# Patient Record
Sex: MALE | Race: WHITE | NOT HISPANIC OR LATINO | Employment: OTHER | URBAN - METROPOLITAN AREA
[De-identification: names, ages, dates, MRNs, and addresses within clinical notes are randomized per-mention and may not be internally consistent; named-entity substitution may affect disease eponyms.]

---

## 2017-02-06 ENCOUNTER — OFFICE VISIT - HEALTHEAST (OUTPATIENT)
Dept: FAMILY MEDICINE | Facility: CLINIC | Age: 57
End: 2017-02-06

## 2017-02-06 DIAGNOSIS — I10 ESSENTIAL HYPERTENSION WITH GOAL BLOOD PRESSURE LESS THAN 140/90: ICD-10-CM

## 2017-02-06 DIAGNOSIS — R73.09 ELEVATED GLUCOSE: ICD-10-CM

## 2017-02-06 LAB — HBA1C MFR BLD: 5.4 % (ref 3.5–6)

## 2017-02-06 ASSESSMENT — MIFFLIN-ST. JEOR: SCORE: 1953.23

## 2017-02-15 ENCOUNTER — COMMUNICATION - HEALTHEAST (OUTPATIENT)
Dept: FAMILY MEDICINE | Facility: CLINIC | Age: 57
End: 2017-02-15

## 2017-02-15 DIAGNOSIS — F01.53 VASCULAR DEMENTIA WITH DEPRESSED MOOD (H): ICD-10-CM

## 2017-03-06 ENCOUNTER — OFFICE VISIT - HEALTHEAST (OUTPATIENT)
Dept: FAMILY MEDICINE | Facility: CLINIC | Age: 57
End: 2017-03-06

## 2017-03-06 DIAGNOSIS — I10 ESSENTIAL HYPERTENSION WITH GOAL BLOOD PRESSURE LESS THAN 140/90: ICD-10-CM

## 2017-03-06 ASSESSMENT — MIFFLIN-ST. JEOR: SCORE: 1962.48

## 2017-03-14 ENCOUNTER — COMMUNICATION - HEALTHEAST (OUTPATIENT)
Dept: LAB | Facility: CLINIC | Age: 57
End: 2017-03-14

## 2017-03-14 DIAGNOSIS — D35.2 PITUITARY ADENOMA (H): ICD-10-CM

## 2017-03-14 DIAGNOSIS — E29.1 HYPOGONADISM IN MALE: ICD-10-CM

## 2017-03-14 DIAGNOSIS — E22.1 HYPERPROLACTINEMIA (H): ICD-10-CM

## 2017-03-17 ENCOUNTER — COMMUNICATION - HEALTHEAST (OUTPATIENT)
Dept: FAMILY MEDICINE | Facility: CLINIC | Age: 57
End: 2017-03-17

## 2017-03-17 DIAGNOSIS — J30.9 ALLERGIC RHINITIS: ICD-10-CM

## 2017-03-17 DIAGNOSIS — J45.909 EXTRINSIC ASTHMA: ICD-10-CM

## 2017-04-04 ENCOUNTER — COMMUNICATION - HEALTHEAST (OUTPATIENT)
Dept: FAMILY MEDICINE | Facility: CLINIC | Age: 57
End: 2017-04-04

## 2017-04-04 DIAGNOSIS — I10 UNSPECIFIED ESSENTIAL HYPERTENSION: ICD-10-CM

## 2017-04-14 ENCOUNTER — AMBULATORY - HEALTHEAST (OUTPATIENT)
Dept: LAB | Facility: CLINIC | Age: 57
End: 2017-04-14

## 2017-04-14 DIAGNOSIS — E22.1 HYPERPROLACTINEMIA (H): ICD-10-CM

## 2017-04-14 DIAGNOSIS — D35.2 PITUITARY ADENOMA (H): ICD-10-CM

## 2017-04-14 DIAGNOSIS — E29.1 HYPOGONADISM IN MALE: ICD-10-CM

## 2017-04-14 LAB
CREAT SERPL-MCNC: 0.74 MG/DL (ref 0.7–1.3)
GFR SERPL CREATININE-BSD FRML MDRD: >60 ML/MIN/1.73M2

## 2017-04-15 LAB
FREE PSA/PSA RATIO: NORMAL RATIO
PSA FREE SERPL-MCNC: 0.2 NG/ML
PSA SERPL-MCNC: 0.43 NG/ML

## 2017-04-21 ENCOUNTER — OFFICE VISIT - HEALTHEAST (OUTPATIENT)
Dept: ENDOCRINOLOGY | Facility: CLINIC | Age: 57
End: 2017-04-21

## 2017-04-21 DIAGNOSIS — E29.1 HYPOGONADISM IN MALE: ICD-10-CM

## 2017-04-21 ASSESSMENT — MIFFLIN-ST. JEOR: SCORE: 1982.06

## 2017-06-29 ENCOUNTER — COMMUNICATION - HEALTHEAST (OUTPATIENT)
Dept: FAMILY MEDICINE | Facility: CLINIC | Age: 57
End: 2017-06-29

## 2017-06-29 DIAGNOSIS — J45.30 MILD PERSISTENT ASTHMA: ICD-10-CM

## 2017-07-02 ENCOUNTER — COMMUNICATION - HEALTHEAST (OUTPATIENT)
Dept: FAMILY MEDICINE | Facility: CLINIC | Age: 57
End: 2017-07-02

## 2017-07-02 DIAGNOSIS — J45.909 EXTRINSIC ASTHMA: ICD-10-CM

## 2017-07-02 DIAGNOSIS — J30.9 ALLERGIC RHINITIS: ICD-10-CM

## 2017-07-02 DIAGNOSIS — I10 UNSPECIFIED ESSENTIAL HYPERTENSION: ICD-10-CM

## 2017-08-28 ENCOUNTER — COMMUNICATION - HEALTHEAST (OUTPATIENT)
Dept: FAMILY MEDICINE | Facility: CLINIC | Age: 57
End: 2017-08-28

## 2017-08-28 DIAGNOSIS — J45.30 MILD PERSISTENT ASTHMA: ICD-10-CM

## 2017-08-31 ENCOUNTER — COMMUNICATION - HEALTHEAST (OUTPATIENT)
Dept: FAMILY MEDICINE | Facility: CLINIC | Age: 57
End: 2017-08-31

## 2017-09-06 ENCOUNTER — OFFICE VISIT - HEALTHEAST (OUTPATIENT)
Dept: FAMILY MEDICINE | Facility: CLINIC | Age: 57
End: 2017-09-06

## 2017-09-06 DIAGNOSIS — I10 ESSENTIAL HYPERTENSION WITH GOAL BLOOD PRESSURE LESS THAN 140/90: ICD-10-CM

## 2017-09-06 DIAGNOSIS — Z23 NEED FOR VACCINATION: ICD-10-CM

## 2017-09-06 RX ORDER — EPINEPHRINE 0.3 MG/.3ML
INJECTION SUBCUTANEOUS
Refills: 0 | Status: SHIPPED | COMMUNITY
Start: 2017-09-05 | End: 2022-09-06

## 2017-09-06 ASSESSMENT — MIFFLIN-ST. JEOR: SCORE: 1986.1

## 2018-01-03 ENCOUNTER — OFFICE VISIT - HEALTHEAST (OUTPATIENT)
Dept: FAMILY MEDICINE | Facility: CLINIC | Age: 58
End: 2018-01-03

## 2018-01-03 DIAGNOSIS — H26.492 CLOUDY POSTERIOR CAPSULE, LEFT: ICD-10-CM

## 2018-01-03 DIAGNOSIS — Z01.818 PREOP EXAMINATION: ICD-10-CM

## 2018-01-03 ASSESSMENT — MIFFLIN-ST. JEOR: SCORE: 1978.16

## 2018-01-24 ENCOUNTER — COMMUNICATION - HEALTHEAST (OUTPATIENT)
Dept: ENDOCRINOLOGY | Facility: CLINIC | Age: 58
End: 2018-01-24

## 2018-01-24 DIAGNOSIS — E22.1 HYPERPROLACTINEMIA (H): ICD-10-CM

## 2018-01-24 DIAGNOSIS — E29.1 TESTICULAR HYPOFUNCTION: ICD-10-CM

## 2018-01-24 DIAGNOSIS — D35.2 PITUITARY ADENOMA (H): ICD-10-CM

## 2018-02-14 ENCOUNTER — COMMUNICATION - HEALTHEAST (OUTPATIENT)
Dept: FAMILY MEDICINE | Facility: CLINIC | Age: 58
End: 2018-02-14

## 2018-02-14 DIAGNOSIS — F01.53 VASCULAR DEMENTIA WITH DEPRESSED MOOD (H): ICD-10-CM

## 2018-03-06 ENCOUNTER — OFFICE VISIT - HEALTHEAST (OUTPATIENT)
Dept: FAMILY MEDICINE | Facility: CLINIC | Age: 58
End: 2018-03-06

## 2018-03-06 DIAGNOSIS — I10 ESSENTIAL HYPERTENSION WITH GOAL BLOOD PRESSURE LESS THAN 140/90: ICD-10-CM

## 2018-03-06 LAB
ANION GAP SERPL CALCULATED.3IONS-SCNC: 8 MMOL/L (ref 5–18)
BUN SERPL-MCNC: 11 MG/DL (ref 8–22)
CALCIUM SERPL-MCNC: 9.6 MG/DL (ref 8.5–10.5)
CHLORIDE BLD-SCNC: 102 MMOL/L (ref 98–107)
CO2 SERPL-SCNC: 33 MMOL/L (ref 22–31)
CREAT SERPL-MCNC: 0.68 MG/DL (ref 0.7–1.3)
GFR SERPL CREATININE-BSD FRML MDRD: >60 ML/MIN/1.73M2
GLUCOSE BLD-MCNC: 86 MG/DL (ref 70–125)
POTASSIUM BLD-SCNC: 4.9 MMOL/L (ref 3.5–5)
SODIUM SERPL-SCNC: 143 MMOL/L (ref 136–145)

## 2018-03-06 ASSESSMENT — MIFFLIN-ST. JEOR: SCORE: 1970.22

## 2018-03-19 ENCOUNTER — COMMUNICATION - HEALTHEAST (OUTPATIENT)
Dept: FAMILY MEDICINE | Facility: CLINIC | Age: 58
End: 2018-03-19

## 2018-03-19 DIAGNOSIS — I10 ESSENTIAL HYPERTENSION WITH GOAL BLOOD PRESSURE LESS THAN 140/90: ICD-10-CM

## 2018-04-03 ENCOUNTER — AMBULATORY - HEALTHEAST (OUTPATIENT)
Dept: ENDOCRINOLOGY | Facility: CLINIC | Age: 58
End: 2018-04-03

## 2018-04-03 DIAGNOSIS — E29.1 HYPOGONADISM IN MALE: ICD-10-CM

## 2018-04-16 ENCOUNTER — AMBULATORY - HEALTHEAST (OUTPATIENT)
Dept: LAB | Facility: CLINIC | Age: 58
End: 2018-04-16

## 2018-04-16 DIAGNOSIS — E29.1 HYPOGONADISM IN MALE: ICD-10-CM

## 2018-04-16 LAB
CALCIUM SERPL-MCNC: 9.1 MG/DL (ref 8.5–10.5)
CREAT SERPL-MCNC: 0.67 MG/DL (ref 0.7–1.3)
GFR SERPL CREATININE-BSD FRML MDRD: >60 ML/MIN/1.73M2
POTASSIUM BLD-SCNC: 4.7 MMOL/L (ref 3.5–5)
PROLACTIN SERPL-MCNC: 7.8 NG/ML (ref 0–15)
T4 FREE SERPL-MCNC: 0.9 NG/DL (ref 0.7–1.8)
TSH SERPL DL<=0.005 MIU/L-ACNC: 0.78 UIU/ML (ref 0.3–5)

## 2018-04-17 LAB
25(OH)D3 SERPL-MCNC: 51.9 NG/ML (ref 30–80)
25(OH)D3 SERPL-MCNC: 51.9 NG/ML (ref 30–80)

## 2018-04-18 LAB — TESTOST SERPL-MCNC: 498 NG/DL (ref 221–716)

## 2018-04-20 ENCOUNTER — OFFICE VISIT - HEALTHEAST (OUTPATIENT)
Dept: ENDOCRINOLOGY | Facility: CLINIC | Age: 58
End: 2018-04-20

## 2018-04-20 DIAGNOSIS — E29.1 TESTICULAR HYPOFUNCTION: ICD-10-CM

## 2018-04-20 DIAGNOSIS — E22.1 HYPERPROLACTINEMIA (H): ICD-10-CM

## 2018-04-20 DIAGNOSIS — D35.2 PITUITARY ADENOMA (H): ICD-10-CM

## 2018-04-20 ASSESSMENT — MIFFLIN-ST. JEOR: SCORE: 1960.01

## 2018-06-05 ENCOUNTER — OFFICE VISIT - HEALTHEAST (OUTPATIENT)
Dept: FAMILY MEDICINE | Facility: CLINIC | Age: 58
End: 2018-06-05

## 2018-06-05 DIAGNOSIS — I10 ESSENTIAL HYPERTENSION WITH GOAL BLOOD PRESSURE LESS THAN 140/90: ICD-10-CM

## 2018-06-05 DIAGNOSIS — J45.30 MILD PERSISTENT ASTHMA WITHOUT COMPLICATION: ICD-10-CM

## 2018-06-05 DIAGNOSIS — J30.1 CHRONIC SEASONAL ALLERGIC RHINITIS DUE TO POLLEN: ICD-10-CM

## 2018-06-05 ASSESSMENT — MIFFLIN-ST. JEOR: SCORE: 1977.02

## 2018-12-05 ENCOUNTER — OFFICE VISIT - HEALTHEAST (OUTPATIENT)
Dept: FAMILY MEDICINE | Facility: CLINIC | Age: 58
End: 2018-12-05

## 2018-12-05 DIAGNOSIS — Z23 NEED FOR VACCINATION: ICD-10-CM

## 2018-12-05 DIAGNOSIS — Z12.11 SCREEN FOR COLON CANCER: ICD-10-CM

## 2018-12-05 DIAGNOSIS — I10 ESSENTIAL HYPERTENSION WITH GOAL BLOOD PRESSURE LESS THAN 140/90: ICD-10-CM

## 2018-12-05 ASSESSMENT — MIFFLIN-ST. JEOR: SCORE: 1953.88

## 2019-01-19 ENCOUNTER — COMMUNICATION - HEALTHEAST (OUTPATIENT)
Dept: FAMILY MEDICINE | Facility: CLINIC | Age: 59
End: 2019-01-19

## 2019-01-19 DIAGNOSIS — I10 ESSENTIAL HYPERTENSION WITH GOAL BLOOD PRESSURE LESS THAN 140/90: ICD-10-CM

## 2019-03-02 ENCOUNTER — COMMUNICATION - HEALTHEAST (OUTPATIENT)
Dept: FAMILY MEDICINE | Facility: CLINIC | Age: 59
End: 2019-03-02

## 2019-03-02 DIAGNOSIS — F01.53 VASCULAR DEMENTIA WITH DEPRESSED MOOD (H): ICD-10-CM

## 2019-04-12 ENCOUNTER — AMBULATORY - HEALTHEAST (OUTPATIENT)
Dept: LAB | Facility: CLINIC | Age: 59
End: 2019-04-12

## 2019-04-12 DIAGNOSIS — D35.2 PITUITARY ADENOMA (H): ICD-10-CM

## 2019-04-12 DIAGNOSIS — E29.1 TESTICULAR HYPOFUNCTION: ICD-10-CM

## 2019-04-12 DIAGNOSIS — E22.1 HYPERPROLACTINEMIA (H): ICD-10-CM

## 2019-04-12 LAB
25(OH)D3 SERPL-MCNC: 46.5 NG/ML (ref 30–80)
25(OH)D3 SERPL-MCNC: 46.5 NG/ML (ref 30–80)
CALCIUM SERPL-MCNC: 9.4 MG/DL (ref 8.5–10.5)
CREAT SERPL-MCNC: 0.76 MG/DL (ref 0.7–1.3)
GFR SERPL CREATININE-BSD FRML MDRD: >60 ML/MIN/1.73M2
POTASSIUM BLD-SCNC: 4.6 MMOL/L (ref 3.5–5)
PROLACTIN SERPL-MCNC: 9.5 NG/ML (ref 0–15)

## 2019-04-15 ENCOUNTER — AMBULATORY - HEALTHEAST (OUTPATIENT)
Dept: ENDOCRINOLOGY | Facility: CLINIC | Age: 59
End: 2019-04-15

## 2019-04-15 DIAGNOSIS — E55.9 VITAMIN D DEFICIENCY: ICD-10-CM

## 2019-04-15 DIAGNOSIS — E22.1 HYPERPROLACTINEMIA (H): ICD-10-CM

## 2019-04-15 DIAGNOSIS — E29.1 HYPOGONADISM IN MALE: ICD-10-CM

## 2019-06-04 ENCOUNTER — OFFICE VISIT - HEALTHEAST (OUTPATIENT)
Dept: FAMILY MEDICINE | Facility: CLINIC | Age: 59
End: 2019-06-04

## 2019-06-04 DIAGNOSIS — J45.30 MILD PERSISTENT ASTHMA, UNCOMPLICATED: ICD-10-CM

## 2019-06-04 DIAGNOSIS — I10 ESSENTIAL HYPERTENSION WITH GOAL BLOOD PRESSURE LESS THAN 140/90: ICD-10-CM

## 2019-06-04 ASSESSMENT — MIFFLIN-ST. JEOR: SCORE: 1979.96

## 2019-06-25 ENCOUNTER — OFFICE VISIT - HEALTHEAST (OUTPATIENT)
Dept: FAMILY MEDICINE | Facility: CLINIC | Age: 59
End: 2019-06-25

## 2019-06-25 DIAGNOSIS — I10 ESSENTIAL HYPERTENSION WITH GOAL BLOOD PRESSURE LESS THAN 140/90: ICD-10-CM

## 2019-06-25 DIAGNOSIS — J45.30 MILD PERSISTENT ASTHMA WITHOUT COMPLICATION: ICD-10-CM

## 2019-06-25 DIAGNOSIS — F33.1 MAJOR DEPRESSIVE DISORDER, RECURRENT EPISODE, MODERATE (H): ICD-10-CM

## 2019-06-25 ASSESSMENT — MIFFLIN-ST. JEOR: SCORE: 1983.14

## 2019-06-26 ENCOUNTER — COMMUNICATION - HEALTHEAST (OUTPATIENT)
Dept: ENDOCRINOLOGY | Facility: CLINIC | Age: 59
End: 2019-06-26

## 2019-06-26 DIAGNOSIS — E29.1 TESTICULAR HYPOFUNCTION: ICD-10-CM

## 2019-06-26 DIAGNOSIS — D35.2 PITUITARY ADENOMA (H): ICD-10-CM

## 2019-06-26 DIAGNOSIS — E22.1 HYPERPROLACTINEMIA (H): ICD-10-CM

## 2019-07-13 ENCOUNTER — COMMUNICATION - HEALTHEAST (OUTPATIENT)
Dept: FAMILY MEDICINE | Facility: CLINIC | Age: 59
End: 2019-07-13

## 2019-07-13 DIAGNOSIS — J30.1 CHRONIC SEASONAL ALLERGIC RHINITIS DUE TO POLLEN: ICD-10-CM

## 2019-07-13 DIAGNOSIS — I10 ESSENTIAL HYPERTENSION WITH GOAL BLOOD PRESSURE LESS THAN 140/90: ICD-10-CM

## 2019-07-13 DIAGNOSIS — J45.30 MILD PERSISTENT ASTHMA WITHOUT COMPLICATION: ICD-10-CM

## 2019-08-10 ENCOUNTER — COMMUNICATION - HEALTHEAST (OUTPATIENT)
Dept: FAMILY MEDICINE | Facility: CLINIC | Age: 59
End: 2019-08-10

## 2019-08-10 DIAGNOSIS — J45.30 MILD PERSISTENT ASTHMA WITHOUT COMPLICATION: ICD-10-CM

## 2019-08-11 RX ORDER — ALBUTEROL SULFATE 90 UG/1
AEROSOL, METERED RESPIRATORY (INHALATION)
Qty: 8.5 G | Refills: 6 | Status: SHIPPED | OUTPATIENT
Start: 2019-08-11 | End: 2022-12-26

## 2019-08-24 ENCOUNTER — COMMUNICATION - HEALTHEAST (OUTPATIENT)
Dept: ENDOCRINOLOGY | Facility: CLINIC | Age: 59
End: 2019-08-24

## 2019-08-24 DIAGNOSIS — D35.2 PITUITARY ADENOMA (H): ICD-10-CM

## 2019-08-24 DIAGNOSIS — E22.1 HYPERPROLACTINEMIA (H): ICD-10-CM

## 2019-08-24 DIAGNOSIS — E29.1 TESTICULAR HYPOFUNCTION: ICD-10-CM

## 2019-10-25 ENCOUNTER — COMMUNICATION - HEALTHEAST (OUTPATIENT)
Dept: FAMILY MEDICINE | Facility: CLINIC | Age: 59
End: 2019-10-25

## 2019-11-16 ENCOUNTER — COMMUNICATION - HEALTHEAST (OUTPATIENT)
Dept: ENDOCRINOLOGY | Facility: CLINIC | Age: 59
End: 2019-11-16

## 2019-11-16 ENCOUNTER — COMMUNICATION - HEALTHEAST (OUTPATIENT)
Dept: FAMILY MEDICINE | Facility: CLINIC | Age: 59
End: 2019-11-16

## 2019-11-16 DIAGNOSIS — D35.2 PITUITARY ADENOMA (H): ICD-10-CM

## 2019-11-16 DIAGNOSIS — E29.1 TESTICULAR HYPOFUNCTION: ICD-10-CM

## 2019-11-16 DIAGNOSIS — E22.1 HYPERPROLACTINEMIA (H): ICD-10-CM

## 2019-11-16 DIAGNOSIS — F01.53 VASCULAR DEMENTIA WITH DEPRESSED MOOD (H): ICD-10-CM

## 2019-12-16 ENCOUNTER — OFFICE VISIT - HEALTHEAST (OUTPATIENT)
Dept: FAMILY MEDICINE | Facility: CLINIC | Age: 59
End: 2019-12-16

## 2019-12-16 DIAGNOSIS — Z23 NEED FOR IMMUNIZATION AGAINST INFLUENZA: ICD-10-CM

## 2019-12-16 DIAGNOSIS — F33.1 MAJOR DEPRESSIVE DISORDER, RECURRENT EPISODE, MODERATE (H): ICD-10-CM

## 2019-12-16 DIAGNOSIS — I10 ESSENTIAL HYPERTENSION WITH GOAL BLOOD PRESSURE LESS THAN 140/90: ICD-10-CM

## 2019-12-16 ASSESSMENT — PATIENT HEALTH QUESTIONNAIRE - PHQ9: SUM OF ALL RESPONSES TO PHQ QUESTIONS 1-9: 5

## 2019-12-16 ASSESSMENT — MIFFLIN-ST. JEOR: SCORE: 2000.61

## 2020-01-18 ENCOUNTER — COMMUNICATION - HEALTHEAST (OUTPATIENT)
Dept: ENDOCRINOLOGY | Facility: CLINIC | Age: 60
End: 2020-01-18

## 2020-01-18 DIAGNOSIS — D35.2 PITUITARY ADENOMA (H): ICD-10-CM

## 2020-01-18 DIAGNOSIS — E22.1 HYPERPROLACTINEMIA (H): ICD-10-CM

## 2020-01-18 DIAGNOSIS — E29.1 TESTICULAR HYPOFUNCTION: ICD-10-CM

## 2020-02-19 ENCOUNTER — OFFICE VISIT - HEALTHEAST (OUTPATIENT)
Dept: FAMILY MEDICINE | Facility: CLINIC | Age: 60
End: 2020-02-19

## 2020-02-19 DIAGNOSIS — I10 ESSENTIAL HYPERTENSION WITH GOAL BLOOD PRESSURE LESS THAN 140/90: ICD-10-CM

## 2020-02-19 DIAGNOSIS — J45.30 MILD PERSISTENT ASTHMA WITHOUT COMPLICATION: ICD-10-CM

## 2020-02-19 DIAGNOSIS — Z12.11 SCREEN FOR COLON CANCER: ICD-10-CM

## 2020-02-19 DIAGNOSIS — F33.1 MAJOR DEPRESSIVE DISORDER, RECURRENT EPISODE, MODERATE (H): ICD-10-CM

## 2020-02-19 ASSESSMENT — MIFFLIN-ST. JEOR: SCORE: 1983.6

## 2020-02-23 ENCOUNTER — COMMUNICATION - HEALTHEAST (OUTPATIENT)
Dept: FAMILY MEDICINE | Facility: CLINIC | Age: 60
End: 2020-02-23

## 2020-02-24 ENCOUNTER — RECORDS - HEALTHEAST (OUTPATIENT)
Dept: ADMINISTRATIVE | Facility: OTHER | Age: 60
End: 2020-02-24

## 2020-03-15 ENCOUNTER — COMMUNICATION - HEALTHEAST (OUTPATIENT)
Dept: ENDOCRINOLOGY | Facility: CLINIC | Age: 60
End: 2020-03-15

## 2020-03-15 DIAGNOSIS — E29.1 TESTICULAR HYPOFUNCTION: ICD-10-CM

## 2020-03-15 DIAGNOSIS — E22.1 HYPERPROLACTINEMIA (H): ICD-10-CM

## 2020-03-15 DIAGNOSIS — D35.2 PITUITARY ADENOMA (H): ICD-10-CM

## 2020-04-10 ENCOUNTER — COMMUNICATION - HEALTHEAST (OUTPATIENT)
Dept: FAMILY MEDICINE | Facility: CLINIC | Age: 60
End: 2020-04-10

## 2020-04-10 ENCOUNTER — COMMUNICATION - HEALTHEAST (OUTPATIENT)
Dept: LAB | Facility: CLINIC | Age: 60
End: 2020-04-10

## 2020-06-14 ENCOUNTER — COMMUNICATION - HEALTHEAST (OUTPATIENT)
Dept: ENDOCRINOLOGY | Facility: CLINIC | Age: 60
End: 2020-06-14

## 2020-06-14 DIAGNOSIS — E29.1 TESTICULAR HYPOFUNCTION: ICD-10-CM

## 2020-06-14 DIAGNOSIS — E22.1 HYPERPROLACTINEMIA (H): ICD-10-CM

## 2020-06-14 DIAGNOSIS — D35.2 PITUITARY ADENOMA (H): ICD-10-CM

## 2020-06-15 ENCOUNTER — COMMUNICATION - HEALTHEAST (OUTPATIENT)
Dept: FAMILY MEDICINE | Facility: CLINIC | Age: 60
End: 2020-06-15

## 2020-06-15 DIAGNOSIS — E22.1 HYPERPROLACTINEMIA (H): ICD-10-CM

## 2020-06-15 DIAGNOSIS — E29.1 TESTICULAR HYPOFUNCTION: ICD-10-CM

## 2020-06-15 DIAGNOSIS — D35.2 PITUITARY ADENOMA (H): ICD-10-CM

## 2020-06-27 ENCOUNTER — COMMUNICATION - HEALTHEAST (OUTPATIENT)
Dept: FAMILY MEDICINE | Facility: CLINIC | Age: 60
End: 2020-06-27

## 2020-06-27 DIAGNOSIS — J45.30 MILD PERSISTENT ASTHMA WITHOUT COMPLICATION: ICD-10-CM

## 2020-06-27 DIAGNOSIS — J30.1 CHRONIC SEASONAL ALLERGIC RHINITIS DUE TO POLLEN: ICD-10-CM

## 2020-06-27 DIAGNOSIS — I10 ESSENTIAL HYPERTENSION WITH GOAL BLOOD PRESSURE LESS THAN 140/90: ICD-10-CM

## 2020-08-26 ENCOUNTER — OFFICE VISIT - HEALTHEAST (OUTPATIENT)
Dept: FAMILY MEDICINE | Facility: CLINIC | Age: 60
End: 2020-08-26

## 2020-08-26 DIAGNOSIS — E29.1 TESTICULAR HYPOFUNCTION: ICD-10-CM

## 2020-08-26 DIAGNOSIS — I10 ESSENTIAL HYPERTENSION WITH GOAL BLOOD PRESSURE LESS THAN 140/90: ICD-10-CM

## 2020-08-26 DIAGNOSIS — D35.2 PITUITARY ADENOMA (H): ICD-10-CM

## 2020-08-26 DIAGNOSIS — E22.1 HYPERPROLACTINEMIA (H): ICD-10-CM

## 2020-08-26 LAB
ANION GAP SERPL CALCULATED.3IONS-SCNC: 8 MMOL/L (ref 5–18)
BUN SERPL-MCNC: 12 MG/DL (ref 8–22)
CALCIUM SERPL-MCNC: 8.9 MG/DL (ref 8.5–10.5)
CHLORIDE BLD-SCNC: 100 MMOL/L (ref 98–107)
CO2 SERPL-SCNC: 30 MMOL/L (ref 22–31)
CREAT SERPL-MCNC: 0.7 MG/DL (ref 0.7–1.3)
GFR SERPL CREATININE-BSD FRML MDRD: >60 ML/MIN/1.73M2
GLUCOSE BLD-MCNC: 120 MG/DL (ref 70–125)
POTASSIUM BLD-SCNC: 4.7 MMOL/L (ref 3.5–5)
PROLACTIN SERPL-MCNC: 10.8 NG/ML (ref 0–15)
SODIUM SERPL-SCNC: 138 MMOL/L (ref 136–145)

## 2020-08-26 RX ORDER — CABERGOLINE 0.5 MG/1
TABLET ORAL
Qty: 8 TABLET | Refills: 11 | Status: SHIPPED | OUTPATIENT
Start: 2020-08-26 | End: 2021-11-01

## 2020-08-26 ASSESSMENT — MIFFLIN-ST. JEOR: SCORE: 1973.84

## 2020-08-26 ASSESSMENT — PATIENT HEALTH QUESTIONNAIRE - PHQ9: SUM OF ALL RESPONSES TO PHQ QUESTIONS 1-9: 3

## 2020-10-08 ENCOUNTER — COMMUNICATION - HEALTHEAST (OUTPATIENT)
Dept: FAMILY MEDICINE | Facility: CLINIC | Age: 60
End: 2020-10-08

## 2020-11-07 ENCOUNTER — COMMUNICATION - HEALTHEAST (OUTPATIENT)
Dept: FAMILY MEDICINE | Facility: CLINIC | Age: 60
End: 2020-11-07

## 2020-11-07 DIAGNOSIS — F01.53 VASCULAR DEMENTIA WITH DEPRESSED MOOD (H): ICD-10-CM

## 2020-12-08 ENCOUNTER — COMMUNICATION - HEALTHEAST (OUTPATIENT)
Dept: FAMILY MEDICINE | Facility: CLINIC | Age: 60
End: 2020-12-08

## 2020-12-08 DIAGNOSIS — I10 ESSENTIAL HYPERTENSION WITH GOAL BLOOD PRESSURE LESS THAN 140/90: ICD-10-CM

## 2020-12-09 RX ORDER — LISINOPRIL 40 MG/1
TABLET ORAL
Qty: 90 TABLET | Refills: 2 | Status: SHIPPED | OUTPATIENT
Start: 2020-12-09 | End: 2021-08-28

## 2021-02-26 ENCOUNTER — OFFICE VISIT - HEALTHEAST (OUTPATIENT)
Dept: FAMILY MEDICINE | Facility: CLINIC | Age: 61
End: 2021-02-26

## 2021-02-26 DIAGNOSIS — I10 ESSENTIAL HYPERTENSION WITH GOAL BLOOD PRESSURE LESS THAN 140/90: ICD-10-CM

## 2021-02-26 ASSESSMENT — PATIENT HEALTH QUESTIONNAIRE - PHQ9: SUM OF ALL RESPONSES TO PHQ QUESTIONS 1-9: 5

## 2021-02-26 ASSESSMENT — MIFFLIN-ST. JEOR: SCORE: 1983.03

## 2021-03-29 ENCOUNTER — AMBULATORY - HEALTHEAST (OUTPATIENT)
Dept: NURSING | Facility: CLINIC | Age: 61
End: 2021-03-29

## 2021-04-19 ENCOUNTER — AMBULATORY - HEALTHEAST (OUTPATIENT)
Dept: NURSING | Facility: CLINIC | Age: 61
End: 2021-04-19

## 2021-04-30 ENCOUNTER — OFFICE VISIT - HEALTHEAST (OUTPATIENT)
Dept: FAMILY MEDICINE | Facility: CLINIC | Age: 61
End: 2021-04-30

## 2021-04-30 DIAGNOSIS — I10 ESSENTIAL HYPERTENSION WITH GOAL BLOOD PRESSURE LESS THAN 140/90: ICD-10-CM

## 2021-04-30 ASSESSMENT — MIFFLIN-ST. JEOR: SCORE: 1992.46

## 2021-05-04 ENCOUNTER — RECORDS - HEALTHEAST (OUTPATIENT)
Dept: FAMILY MEDICINE | Facility: CLINIC | Age: 61
End: 2021-05-04

## 2021-05-04 DIAGNOSIS — F01.53 VASCULAR DEMENTIA WITH DEPRESSED MOOD (H): ICD-10-CM

## 2021-05-04 RX ORDER — VENLAFAXINE HYDROCHLORIDE 150 MG/1
CAPSULE, EXTENDED RELEASE ORAL
Qty: 90 CAPSULE | Refills: 3 | Status: SHIPPED | OUTPATIENT
Start: 2021-05-04 | End: 2022-04-12

## 2021-05-26 ASSESSMENT — PATIENT HEALTH QUESTIONNAIRE - PHQ9
SUM OF ALL RESPONSES TO PHQ QUESTIONS 1-9: 3
SUM OF ALL RESPONSES TO PHQ QUESTIONS 1-9: 5

## 2021-05-27 ASSESSMENT — PATIENT HEALTH QUESTIONNAIRE - PHQ9: SUM OF ALL RESPONSES TO PHQ QUESTIONS 1-9: 5

## 2021-05-28 ASSESSMENT — ASTHMA QUESTIONNAIRES
ACT_TOTALSCORE: 24
ACT_TOTALSCORE: 22
ACT_TOTALSCORE: 21

## 2021-05-29 NOTE — PROGRESS NOTES
Assessment: /    Plan:    1. Essential hypertension with goal blood pressure less than 140/90  lisinopril (PRINIVIL,ZESTRIL) 30 MG tablet   2. Mild persistent asthma, uncomplicated  fluticasone propionate (FLOVENT HFA) 110 mcg/actuation inhaler       Increase lisinopril to 30 mg.  He will use Flovent again.  He will obtain a blood pressure cuff.  He will submit a Cologuard sample.  Recheck in 3 weeks.      Subjective:    HPI:  Breezy Bill is a 59-year-old male presenting for follow-up on hypertension and asthma.    He uses albuterol twice per week during the daytime and 3 times per week at night.      Review of Systems: No fever or sputum production      Current Outpatient Medications   Medication Sig Dispense Refill     albuterol (PROAIR HFA) 90 mcg/actuation inhaler Inhale 1-2 puffs every 4 (four) hours as needed. 8.5 Inhaler 11     cabergoline (DOSTINEX) 0.5 mg tablet TAKE 1/2 TABLET BY MOUTH TWICE WEEKLY 12 tablet 1     cholecalciferol, vitamin D3, (VITAMIN D3) 2,000 unit cap Take 1 capsule by mouth daily.       dorzolamide-timolol (COSOPT) 22.3-6.8 mg/mL ophthalmic solution   10     EPINEPHrine (EPIPEN) 0.3 mg/0.3 mL atIn   0     flunisolide (NASALIDE) 25 mcg (0.025 %) Spry USE 1 SPRAY IN EACH NOSTRIL TWICE DAILY. 25 mL 5     hydroCHLOROthiazide (HYDRODIURIL) 12.5 MG tablet Take 1 tablet (12.5 mg total) by mouth daily. 90 tablet 3     ketotifen (ZADITOR) 0.025 % ophthalmic solution 1 drop as needed.       latanoprost (XALATAN) 0.005 % ophthalmic solution   10     loratadine (CLARITIN) 10 mg tablet Take 10 mg by mouth daily.       montelukast (SINGULAIR) 10 mg tablet Take 1 tablet (10 mg total) by mouth daily. 90 tablet 3     MULTIVITAMIN ORAL Take 1 tablet by mouth daily.       venlafaxine (EFFEXOR-XR) 150 MG 24 hr capsule TAKE ONE CAPSULE BY MOUTH ONE TIME DAILY  90 capsule 2     fluticasone propionate (FLOVENT HFA) 110 mcg/actuation inhaler INHALE ONE PUFF BY MOUTH TWICE DAILY 1 Inhaler 5     lisinopril  "(PRINIVIL,ZESTRIL) 30 MG tablet Take 1 tablet (30 mg total) by mouth daily. 90 tablet 3     No current facility-administered medications for this visit.          Objective:    Vitals:    06/04/19 0811 06/04/19 0828   BP: 150/84 148/78   Pulse: 78    Resp: 16    SpO2: 94%    Weight: (!) 260 lb (117.9 kg)    Height: 5' 8.7\" (1.745 m)        Gen:  NAD, VSS  Weight increased 6 pounds  Lungs:  normal  Heart:  normal          ADDITIONAL HISTORY SUMMARIZED (2): None.  DECISION TO OBTAIN EXTRA INFORMATION (1): None.   RADIOLOGY TESTS (1): None.  LABS (1): None.  MEDICINE TESTS (1): None.  INDEPENDENT REVIEW (2 each): None.     Total Data Points: 0    "

## 2021-05-30 VITALS — BODY MASS INDEX: 38.41 KG/M2 | HEIGHT: 69 IN | WEIGHT: 259.36 LBS

## 2021-05-30 VITALS — BODY MASS INDEX: 37.77 KG/M2 | HEIGHT: 69 IN | WEIGHT: 255.04 LBS

## 2021-05-30 VITALS — HEIGHT: 69 IN | BODY MASS INDEX: 37.47 KG/M2 | WEIGHT: 253 LBS

## 2021-05-30 NOTE — TELEPHONE ENCOUNTER
RN cannot approve Refill Request x2    RN can NOT refill these medications  Montelukast (SINGULAIR) med is not covered by policy/route to provider.     hydroCHLOROthiazide (HUDRODIURIL) protocol failed no refill given    Olya Rendon, [facility] Connection Triage/Med Refill 7/13/2019    Requested Prescriptions   Pending Prescriptions Disp Refills     montelukast (SINGULAIR) 10 mg tablet [Pharmacy Med Name: Montelukast Sodium Oral Tablet 10 MG] 90 tablet 2     Sig: TAKE ONE TABLET BY MOUTH ONE TIME DAILY       There is no refill protocol information for this order        hydroCHLOROthiazide (HYDRODIURIL) 12.5 MG tablet [Pharmacy Med Name: hydroCHLOROthiazide Oral Tablet 12.5 MG] 90 tablet 2     Sig: TAKE ONE TABLET BY MOUTH ONE TIME DAILY       Diuretics/Combination Diuretics Refill Protocol  Failed - 7/13/2019 10:53 AM        Failed - Serum Sodium in past 12 months      No results found for: LN-SODIUM          Passed - Visit with PCP or prescribing provider visit in past 12 months     Last office visit with prescriber/PCP: 6/25/2019 Rajiv Figueroa MD OR same dept: 6/25/2019 Rajiv Figueroa MD OR same specialty: 6/25/2019 Rajiv Figueroa MD  Last physical: 1/3/2018 Last MTM visit: Visit date not found   Next visit within 3 mo: Visit date not found  Next physical within 3 mo: Visit date not found  Prescriber OR PCP: Rajiv Figueroa MD  Last diagnosis associated with med order: 1. Mild persistent asthma without complication  - montelukast (SINGULAIR) 10 mg tablet [Pharmacy Med Name: Montelukast Sodium Oral Tablet 10 MG]; TAKE ONE TABLET BY MOUTH ONE TIME DAILY   Dispense: 90 tablet; Refill: 2    2. Chronic seasonal allergic rhinitis due to pollen  - montelukast (SINGULAIR) 10 mg tablet [Pharmacy Med Name: Montelukast Sodium Oral Tablet 10 MG]; TAKE ONE TABLET BY MOUTH ONE TIME DAILY   Dispense: 90 tablet; Refill: 2    3. Essential hypertension with goal blood pressure less than 140/90  -  hydroCHLOROthiazide (HYDRODIURIL) 12.5 MG tablet [Pharmacy Med Name: hydroCHLOROthiazide Oral Tablet 12.5 MG]; TAKE ONE TABLET BY MOUTH ONE TIME DAILY   Dispense: 90 tablet; Refill: 2    If protocol passes may refill for 12 months if within 3 months of last provider visit (or a total of 15 months).             Passed - Serum Potassium in past 12 months      Lab Results   Component Value Date    Potassium 4.6 04/12/2019             Passed - Blood pressure on file in past 12 months     BP Readings from Last 1 Encounters:   06/25/19 138/78             Passed - Serum Creatinine in past 12 months      Creatinine   Date Value Ref Range Status   04/12/2019 0.76 0.70 - 1.30 mg/dL Final

## 2021-05-30 NOTE — TELEPHONE ENCOUNTER
rx refill request received from [facility] for Cabergoline.     Will send to PCP to authorize because Dr. Duncan is on medical leave.     Can you refill this?

## 2021-05-30 NOTE — PROGRESS NOTES
Assessment: /    Plan:    1. Essential hypertension with goal blood pressure less than 140/90     2. Mild persistent asthma without complication     3. Moderate Recurrent Major Depression         Continue current medications.  Recheck in 6 months.      Subjective:    HPI:  Breezy Bill is a 59-year-old male presented for follow-up on hypertension.  We increased lisinopril to 30 mg.    He has been using the Flovent inhaler, and has not needed to use his rescue inhaler.    He has not been feeling depressed, PHQ-9 this month was 3.       Review of Systems: No fever or cough.      Current Outpatient Medications   Medication Sig Dispense Refill     albuterol (PROAIR HFA) 90 mcg/actuation inhaler Inhale 1-2 puffs every 4 (four) hours as needed. 8.5 Inhaler 11     cholecalciferol, vitamin D3, (VITAMIN D3) 2,000 unit cap Take 1 capsule by mouth daily.       dorzolamide-timolol (COSOPT) 22.3-6.8 mg/mL ophthalmic solution   10     EPINEPHrine (EPIPEN) 0.3 mg/0.3 mL atIn   0     flunisolide (NASALIDE) 25 mcg (0.025 %) Spry USE 1 SPRAY IN EACH NOSTRIL TWICE DAILY. 25 mL 5     fluticasone propionate (FLOVENT HFA) 110 mcg/actuation inhaler INHALE ONE PUFF BY MOUTH TWICE DAILY 1 Inhaler 5     hydroCHLOROthiazide (HYDRODIURIL) 12.5 MG tablet Take 1 tablet (12.5 mg total) by mouth daily. 90 tablet 3     ketotifen (ZADITOR) 0.025 % ophthalmic solution 1 drop as needed.       latanoprost (XALATAN) 0.005 % ophthalmic solution   10     lisinopril (PRINIVIL,ZESTRIL) 30 MG tablet Take 1 tablet (30 mg total) by mouth daily. 90 tablet 3     loratadine (CLARITIN) 10 mg tablet Take 10 mg by mouth daily.       montelukast (SINGULAIR) 10 mg tablet Take 1 tablet (10 mg total) by mouth daily. 90 tablet 3     MULTIVITAMIN ORAL Take 1 tablet by mouth daily.       venlafaxine (EFFEXOR-XR) 150 MG 24 hr capsule TAKE ONE CAPSULE BY MOUTH ONE TIME DAILY  90 capsule 2     cabergoline (DOSTINEX) 0.5 mg tablet TAKE 1/2 TABLET BY MOUTH TWICE WEEKLY 12 tablet  0     No current facility-administered medications for this visit.          Objective:    Vitals:    06/25/19 0802   BP: 138/78   Pulse: 72   Resp: 19   Temp: 98.5  F (36.9  C)   SpO2: 92%       Gen:  NAD, VSS  Lungs:  normal  Heart:  normal          ADDITIONAL HISTORY SUMMARIZED (2): None.  DECISION TO OBTAIN EXTRA INFORMATION (1): None.   RADIOLOGY TESTS (1): None.  LABS (1): None.  MEDICINE TESTS (1): None.  INDEPENDENT REVIEW (2 each): None.     Total Data Points: 0

## 2021-05-31 VITALS — WEIGHT: 258.5 LBS | HEIGHT: 69 IN | BODY MASS INDEX: 38.29 KG/M2

## 2021-05-31 VITALS — HEIGHT: 69 IN | WEIGHT: 260.25 LBS | BODY MASS INDEX: 38.55 KG/M2

## 2021-05-31 NOTE — TELEPHONE ENCOUNTER
Refill Approved    Rx renewed per Medication Renewal Policy. Medication was last renewed on 6/5/18.    Tori Ellington, [facility] Triage/Med Refill 8/11/2019     Requested Prescriptions   Pending Prescriptions Disp Refills     PROAIR HFA 90 mcg/actuation inhaler [Pharmacy Med Name: ProAir HFA Inhalation Aerosol Solution 108 (90 Base) MCG/ACT] 8.5 g 10     Sig: INHALE ONE OR TWO PUFFS BY MOUTH EVERY FOUR HOURS AS NEEDED       Albuterol/Levalbuterol Refill Protocol Passed - 8/10/2019  3:17 PM        Passed - PCP or prescribing provider visit in last year     Last office visit with prescriber/PCP: 6/25/2019 Rajiv Figueroa MD OR same dept: 6/25/2019 Rajiv Figueroa MD OR same specialty: 6/25/2019 Rajiv Figueroa MD Last physical: 1/3/2018       Next appt within 3 mo: Visit date not found  Next physical within 3 mo: Visit date not found  Prescriber OR PCP: Rajiv Figueroa MD  Last diagnosis associated with med order: 1. Mild persistent asthma without complication  - PROAIR HFA 90 mcg/actuation inhaler [Pharmacy Med Name: ProAir HFA Inhalation Aerosol Solution 108 (90 Base) MCG/ACT]; INHALE ONE OR TWO PUFFS BY MOUTH EVERY FOUR HOURS AS NEEDED   Dispense: 8.5 g; Refill: 10    If protocol passes may refill for 6 months if within 3 months of last provider visit (or a total of 9 months). If patient requesting >1 inhaler per month refill x 6 months and have patient make appointment with provider.

## 2021-06-01 ENCOUNTER — RECORDS - HEALTHEAST (OUTPATIENT)
Dept: ADMINISTRATIVE | Facility: OTHER | Age: 61
End: 2021-06-01

## 2021-06-01 VITALS — WEIGHT: 254.5 LBS | HEIGHT: 69 IN | BODY MASS INDEX: 37.69 KG/M2

## 2021-06-01 VITALS — BODY MASS INDEX: 38.25 KG/M2 | HEIGHT: 69 IN | WEIGHT: 258.25 LBS

## 2021-06-01 VITALS — HEIGHT: 69 IN | WEIGHT: 256.75 LBS | BODY MASS INDEX: 38.03 KG/M2

## 2021-06-01 DIAGNOSIS — J45.30 MILD PERSISTENT ASTHMA WITHOUT COMPLICATION: ICD-10-CM

## 2021-06-01 DIAGNOSIS — I10 ESSENTIAL HYPERTENSION WITH GOAL BLOOD PRESSURE LESS THAN 140/90: ICD-10-CM

## 2021-06-01 DIAGNOSIS — J30.1 CHRONIC SEASONAL ALLERGIC RHINITIS DUE TO POLLEN: ICD-10-CM

## 2021-06-01 RX ORDER — HYDROCHLOROTHIAZIDE 12.5 MG/1
TABLET ORAL
Qty: 90 TABLET | Refills: 3 | Status: SHIPPED | OUTPATIENT
Start: 2021-06-01 | End: 2022-07-20

## 2021-06-01 RX ORDER — MONTELUKAST SODIUM 10 MG/1
TABLET ORAL
Qty: 90 TABLET | Refills: 3 | Status: SHIPPED | OUTPATIENT
Start: 2021-06-01 | End: 2022-08-01

## 2021-06-02 VITALS — WEIGHT: 254.25 LBS | HEIGHT: 69 IN | BODY MASS INDEX: 37.66 KG/M2

## 2021-06-02 NOTE — TELEPHONE ENCOUNTER
CMT left message x 1. Please review message thread below and advise the patient as indicated. Please schedule if necessary or indicated in message thread. Per clinic outreach policy, writer will call three times before sending letter and postponing 6 weeks. CMT will then repeat call sequence (3 calls, 1 call per week) and send final letter if unable to reach the patient.

## 2021-06-02 NOTE — TELEPHONE ENCOUNTER
reports indicate that the patient needs colon cancer screening. [facility] ordered 12/2018 but no results received. If non-English speaking, CMT will schedule patient to discuss colon cancer screening options with PCP. Writer intends to contact the patient to assist in scheduling and appointing for this purpose. Per clinic policy, writer will three times prior to closing encounter.

## 2021-06-03 VITALS — HEIGHT: 69 IN | BODY MASS INDEX: 38.69 KG/M2 | WEIGHT: 261.25 LBS

## 2021-06-03 VITALS — WEIGHT: 260 LBS | HEIGHT: 69 IN | BODY MASS INDEX: 38.51 KG/M2

## 2021-06-03 NOTE — TELEPHONE ENCOUNTER
CMT left message x 3. Please review message thread below and advise the patient as indicated. Please schedule if necessary or indicated in message thread. Per clinic outreach policy, writer will call three times before sending letter and postponing 6 weeks. CMT will then repeat call sequence (3 calls, 1 call per week) and send final letter if unable to reach the patient. Letter sent.

## 2021-06-03 NOTE — TELEPHONE ENCOUNTER
RN cannot approve Refill Request    RN can NOT refill this medication Protocol failed and NO refill given.       Rianna Hernandez, [facility] Triage/Med Refill 11/17/2019    Requested Prescriptions   Pending Prescriptions Disp Refills     venlafaxine (EFFEXOR-XR) 150 MG 24 hr capsule [Pharmacy Med Name: Venlafaxine HCl ER Oral Capsule Extended Release 24 Hour 150 MG] 90 capsule 3     Sig: TAKE ONE CAPSULE BY MOUTH ONE TIME DAILY       Venlafaxine/Desvenlafaxine Refill Protocol Failed - 11/16/2019  3:14 PM        Failed - LFT or AST or ALT in last year     Albumin   Date Value Ref Range Status   10/13/2016 4.3 3.5 - 5.0 g/dL Final     Bilirubin, Total   Date Value Ref Range Status   10/13/2016 0.5 0.0 - 1.0 mg/dL Final     Bilirubin, Direct   Date Value Ref Range Status   10/13/2016 0.2 <=0.5 mg/dL Final     Alkaline Phosphatase   Date Value Ref Range Status   10/13/2016 89 45 - 120 U/L Final     AST   Date Value Ref Range Status   10/13/2016 23 0 - 40 U/L Final     ALT   Date Value Ref Range Status   10/13/2016 28 0 - 45 U/L Final     Protein, Total   Date Value Ref Range Status   10/13/2016 7.7 6.0 - 8.0 g/dL Final                Failed - Fasting lipid cascade in last year     Cholesterol   Date Value Ref Range Status   04/22/2016 195 <=199 mg/dL Final     Triglycerides   Date Value Ref Range Status   04/22/2016 67 <=149 mg/dL Final     HDL Cholesterol   Date Value Ref Range Status   04/22/2016 55 >=40 mg/dL Final     LDL Calculated   Date Value Ref Range Status   04/22/2016 127 <=129 mg/dL Final     Patient Fasting > 8hrs?   Date Value Ref Range Status   04/22/2016 Yes  Final             Passed - PCP or prescribing provider visit in last year     Last office visit with prescriber/PCP: 6/25/2019 Rajiv Figueroa MD OR same dept: 6/25/2019 Rajiv Figueroa MD OR same specialty: 6/25/2019 Rajiv Figueroa MD  Last physical: 1/3/2018 Last MTM visit: Visit date not found   Next visit within 3 mo: Visit  date not found  Next physical within 3 mo: Visit date not found  Prescriber OR PCP: Rajiv Figueroa MD  Last diagnosis associated with med order: 1. Vascular dementia with depressed mood (H)  - venlafaxine (EFFEXOR-XR) 150 MG 24 hr capsule [Pharmacy Med Name: Venlafaxine HCl ER Oral Capsule Extended Release 24 Hour 150 MG]; TAKE ONE CAPSULE BY MOUTH ONE TIME DAILY   Dispense: 90 capsule; Refill: 1    If protocol passes may refill for 12 months if within 3 months of last provider visit (or a total of 15 months).             Passed - Blood Pressure in last year     BP Readings from Last 1 Encounters:   06/25/19 138/78

## 2021-06-04 VITALS
HEART RATE: 75 BPM | DIASTOLIC BLOOD PRESSURE: 70 MMHG | RESPIRATION RATE: 20 BRPM | BODY MASS INDEX: 38.73 KG/M2 | WEIGHT: 261.5 LBS | HEIGHT: 69 IN | OXYGEN SATURATION: 96 % | SYSTOLIC BLOOD PRESSURE: 136 MMHG | TEMPERATURE: 98.7 F

## 2021-06-04 VITALS
WEIGHT: 257.25 LBS | TEMPERATURE: 98.9 F | BODY MASS INDEX: 38.1 KG/M2 | HEART RATE: 67 BPM | HEIGHT: 69 IN | OXYGEN SATURATION: 95 % | SYSTOLIC BLOOD PRESSURE: 128 MMHG | RESPIRATION RATE: 24 BRPM | DIASTOLIC BLOOD PRESSURE: 66 MMHG

## 2021-06-04 VITALS
WEIGHT: 265.25 LBS | SYSTOLIC BLOOD PRESSURE: 154 MMHG | HEIGHT: 69 IN | HEART RATE: 64 BPM | RESPIRATION RATE: 16 BRPM | DIASTOLIC BLOOD PRESSURE: 84 MMHG | TEMPERATURE: 98.3 F | BODY MASS INDEX: 39.29 KG/M2 | OXYGEN SATURATION: 94 %

## 2021-06-04 NOTE — TELEPHONE ENCOUNTER
CMT left message x 3. Please review message thread below and advise the patient as indicated. Please schedule if necessary or indicated in message thread. Per clinic outreach policy, writer will call three times before sending letter and postponing 6 weeks. CMT will then repeat call sequence (3 calls, 1 call per week) and send final letter if unable to reach the patient.

## 2021-06-04 NOTE — PROGRESS NOTES
Assessment: /    Plan:    1. Essential hypertension with goal blood pressure less than 140/90  lisinopril (PRINIVIL,ZESTRIL) 40 MG tablet   2. Moderate Recurrent Major Depression  PHQ9 Depression Screen   3. Need for immunization against influenza  Influenza, Recombinant, Inj, Quadrivalent, PF, 18+YRS       Increase lisinopril to 40 mg.  He will alternate days with 30 mg until finished.    Recheck in 2 months.      Subjective:    HPI:  Breezy Bill is a 59-year-old male presenting for follow-up on hypertension.  He took his medications at 7:30 am.  BP at home was 136/78.    He takes venlafaxine for depression.  Stable.      Review of Systems:  No fever or cough.      Current Outpatient Medications   Medication Sig Dispense Refill     cabergoline (DOSTINEX) 0.5 mg tablet TAKE 1/2 TABLET BY MOUTH TWICE WEEKLY 8 tablet 0     cholecalciferol, vitamin D3, (VITAMIN D3) 2,000 unit cap Take 1 capsule by mouth daily.       dorzolamide-timolol (COSOPT) 22.3-6.8 mg/mL ophthalmic solution   10     EPINEPHrine (EPIPEN) 0.3 mg/0.3 mL atIn   0     fluticasone propionate (FLOVENT HFA) 110 mcg/actuation inhaler INHALE ONE PUFF BY MOUTH TWICE DAILY 1 Inhaler 5     hydroCHLOROthiazide (HYDRODIURIL) 12.5 MG tablet TAKE ONE TABLET BY MOUTH ONE TIME DAILY  90 tablet 3     ketotifen (ZADITOR) 0.025 % ophthalmic solution 1 drop as needed.       latanoprost (XALATAN) 0.005 % ophthalmic solution   10     loratadine (CLARITIN) 10 mg tablet Take 10 mg by mouth daily.       montelukast (SINGULAIR) 10 mg tablet TAKE ONE TABLET BY MOUTH ONE TIME DAILY  90 tablet 3     MULTIVITAMIN ORAL Take 1 tablet by mouth daily.       PROAIR HFA 90 mcg/actuation inhaler INHALE ONE OR TWO PUFFS BY MOUTH EVERY FOUR HOURS AS NEEDED  8.5 g 6     venlafaxine (EFFEXOR-XR) 150 MG 24 hr capsule TAKE ONE CAPSULE BY MOUTH ONE TIME DAILY  90 capsule 3     lisinopril (PRINIVIL,ZESTRIL) 40 MG tablet Take 1 tablet (40 mg total) by mouth daily. 90 tablet 3     No current  "facility-administered medications for this visit.          Objective:    Vitals:    12/16/19 0805 12/16/19 0825   BP: (!) 160/96 154/84   Pulse: 64    Resp: 16    Temp: 98.3  F (36.8  C)    TempSrc: Oral    SpO2: 94%    Weight: (!) 265 lb 4 oz (120.3 kg)    Height: 5' 8.5\" (1.74 m)        Gen:  NAD, VSS  Lungs:  normal  Heart:  normal    PHQ-9 = 5      ADDITIONAL HISTORY SUMMARIZED (2): None.  DECISION TO OBTAIN EXTRA INFORMATION (1): None.   RADIOLOGY TESTS (1): None.  LABS (1): None.  MEDICINE TESTS (1): None.  INDEPENDENT REVIEW (2 each): None.     Total Data Points: 0    "

## 2021-06-05 VITALS
DIASTOLIC BLOOD PRESSURE: 84 MMHG | TEMPERATURE: 98.7 F | HEIGHT: 69 IN | SYSTOLIC BLOOD PRESSURE: 148 MMHG | OXYGEN SATURATION: 95 % | BODY MASS INDEX: 38.58 KG/M2 | WEIGHT: 260.5 LBS | HEART RATE: 84 BPM

## 2021-06-05 VITALS
OXYGEN SATURATION: 92 % | WEIGHT: 262.75 LBS | HEIGHT: 69 IN | HEART RATE: 79 BPM | SYSTOLIC BLOOD PRESSURE: 136 MMHG | DIASTOLIC BLOOD PRESSURE: 80 MMHG | TEMPERATURE: 98.5 F | BODY MASS INDEX: 38.92 KG/M2

## 2021-06-06 NOTE — PROGRESS NOTES
Assessment: /    Plan:    1. Essential hypertension with goal blood pressure less than 140/90     2. Mild persistent asthma without complication     3. Moderate Recurrent Major Depression     4. Screen for colon cancer  Cologuard       He will continue current medications.    He has endocrinology labs scheduled for April.    He plans to submit a Cologuard sample in the near future.  He still has the kit available.  New order written, so that it will be current.    Recheck in 6 months.      Subjective:    HPI:  Breezy Bill is a 59-year-old male presenting for follow-up on hypertension.  He takes lisinopril and hydrochlorothiazide.    He also has asthma, and has not been having symptoms related to that.  ACT = 22    Depression has been under control, taking venlafaxine.      Review of Systems: No fever, cough or chest pain.      Current Outpatient Medications   Medication Sig Dispense Refill     cabergoline (DOSTINEX) 0.5 mg tablet TAKE 1/2 TABLET BY MOUTH TWICE WEEKLY 8 tablet 0     cholecalciferol, vitamin D3, (VITAMIN D3) 2,000 unit cap Take 1 capsule by mouth daily.       dorzolamide-timolol (COSOPT) 22.3-6.8 mg/mL ophthalmic solution   10     EPINEPHrine (EPIPEN) 0.3 mg/0.3 mL atIn   0     fluticasone propionate (FLOVENT HFA) 110 mcg/actuation inhaler INHALE ONE PUFF BY MOUTH TWICE DAILY 1 Inhaler 5     hydroCHLOROthiazide (HYDRODIURIL) 12.5 MG tablet TAKE ONE TABLET BY MOUTH ONE TIME DAILY  90 tablet 3     ketotifen (ZADITOR) 0.025 % ophthalmic solution 1 drop as needed.       latanoprost (XALATAN) 0.005 % ophthalmic solution   10     lisinopril (PRINIVIL,ZESTRIL) 40 MG tablet Take 1 tablet (40 mg total) by mouth daily. 90 tablet 3     loratadine (CLARITIN) 10 mg tablet Take 10 mg by mouth daily.       montelukast (SINGULAIR) 10 mg tablet TAKE ONE TABLET BY MOUTH ONE TIME DAILY  90 tablet 3     MULTIVITAMIN ORAL Take 1 tablet by mouth daily.       PROAIR HFA 90 mcg/actuation inhaler INHALE ONE OR TWO PUFFS BY  MOUTH EVERY FOUR HOURS AS NEEDED  8.5 g 6     venlafaxine (EFFEXOR-XR) 150 MG 24 hr capsule TAKE ONE CAPSULE BY MOUTH ONE TIME DAILY  90 capsule 3     No current facility-administered medications for this visit.          Objective:    Vitals:    02/19/20 0758   BP: 136/70   Pulse: 75   Resp: 20   Temp: 98.7  F (37.1  C)   SpO2: 96%       Gen:  NAD, VSS  Lungs:  normal  Heart:  normal          ADDITIONAL HISTORY SUMMARIZED (2): None.  DECISION TO OBTAIN EXTRA INFORMATION (1): None.   RADIOLOGY TESTS (1): None.  LABS (1): None.  MEDICINE TESTS (1): None.  INDEPENDENT REVIEW (2 each): None.     Total Data Points: 0

## 2021-06-07 NOTE — TELEPHONE ENCOUNTER
Date: 4/15/2020 Status: Can   Time: 9:50 AM Length: 10   Visit Type: LAB [[id number]] Copay: $0.00   Provider: [facility] Department: [facility]   Referring Provider: MARIIA SOTO CSN: [id number]   Notes: Rescheduled-lab only- endo salromeo   Rescheduled:  Change Notes:  Canceled: 4/10/2020 9:17 AM  4/10/2020 9:38 AM  4/10/2020 11:17 AM By:  By:  By: JESÚS ROSA, PÉREZ YE   Cancel Rsn: Provider Initiated (LVM that appt cancel since Dr Duncan is on JUL[place]TE for over a year now and no provider to review them. LVM to find a new endo or see if PCP can handle.)

## 2021-06-07 NOTE — TELEPHONE ENCOUNTER
Patient is on our lab schedule with orders from Dr Duncan.  Please review and call patient to make other arrangements.  Thank you  Lab

## 2021-06-09 NOTE — PROGRESS NOTES
Assessment: /    Plan:    1. Essential hypertension with goal blood pressure less than 140/90     2. Elevated glucose  Glycosylated Hemoglobin A1c       Increase lisinopril to 20 mg daily.  Recheck in one month.      Subjective:    HPI:  Breezy Bill is a 56-year-old male presenting for follow-up on hypertension.  He also has previously elevated glucose reading.  He has been going to the health club for workouts.  He went on a 5K walk.    He has not needed an albuterol inhaler for more than 1 month.  Asthma control test score equals 25.    He has not been feeling depressed.      Review of Systems:  No fever, cough or chest pain.      Current Outpatient Prescriptions   Medication Sig Dispense Refill     cabergoline (DOSTINEX) 0.5 mg tablet TAKE 1/2 TABLET BY MOUTH TWICE WEEKLY 8 tablet 4     cholecalciferol, vitamin D3, (VITAMIN D3) 2,000 unit cap Take 1 capsule by mouth daily.       dorzolamide-timolol (COSOPT) 22.3-6.8 mg/mL ophthalmic solution   10     EPINEPHrine (EPIPEN 2-CHLOÉ) 0.3 mg/0.3 mL (1:1,000) atIn Inject 0.3 mg into the shoulder, thigh, or buttocks once. Use as directed       FLOVENT  mcg/actuation inhaler INHALE ONE PUFF BY MOUTH TWICE DAILY 12 Inhaler 5     flunisolide (NASALIDE) 25 mcg (0.025 %) Spry USE 1 SPRAY IN EACH NOSTRIL TWICE DAILY. 25 mL 5     hydrochlorothiazide (HYDRODIURIL) 12.5 MG tablet Take 1 tablet (12.5 mg total) by mouth daily. 30 tablet 11     ketotifen (ZADITOR) 0.025 % ophthalmic solution 1 drop as needed.       latanoprost (XALATAN) 0.005 % ophthalmic solution   10     loratadine (CLARITIN) 10 mg tablet Take 10 mg by mouth daily.       montelukast (SINGULAIR) 10 mg tablet Take 1 tablet (10 mg total) by mouth daily. 90 tablet 0     MULTIVITAMIN ORAL Take 1 tablet by mouth daily.       PROAIR HFA 90 mcg/actuation inhaler INHALE ONE OR TWO PUFFS BY MOUTH EVERY FOUR HOURS AS NEEDED  8.5 g 5     lisinopril (PRINIVIL,ZESTRIL) 20 MG tablet Take 1 tablet (20 mg total) by mouth  "daily. 90 tablet 3     venlafaxine (EFFEXOR-XR) 150 MG 24 hr capsule Take 1 capsule (150 mg total) by mouth daily. 90 capsule 3     No current facility-administered medications for this visit.          Objective:    Vitals:    02/06/17 0804 02/06/17 0809   BP: 146/80 150/80   Patient Site: Left Arm Left Arm   Patient Position: Sitting Sitting   Cuff Size: Adult Large Adult Large   Pulse: 70    Resp: 19    Temp: 98.6  F (37  C)    TempSrc: Oral    SpO2: 93%    Weight: (!) 253 lb (114.8 kg)    Height: 5' 8.7\" (1.745 m)        Gen:  NAD, VSS  Lungs:  normal  Heart:  normal          ADDITIONAL HISTORY SUMMARIZED (2): None.  DECISION TO OBTAIN EXTRA INFORMATION (1): None.   RADIOLOGY TESTS (1): None.  LABS (1): A1c.  MEDICINE TESTS (1): None.  INDEPENDENT REVIEW (2 each): None.     Total Data Points: 1    "

## 2021-06-09 NOTE — TELEPHONE ENCOUNTER
RN cannot approve Refill Request    RN can NOT refill this medication PCP messaged that patient is overdue for Labs. Last office visit: 2/19/2020 Rajiv Figueroa MD Last Physical: 1/3/2018 Last MTM visit: Visit date not found Last visit same specialty: 2/19/2020 Rajiv Figueroa MD.  Next visit within 3 mo: Visit date not found  Next physical within 3 mo: Visit date not found      Virgie Arenas, [facility] Triage/Med Refill 6/28/2020    Requested Prescriptions   Pending Prescriptions Disp Refills     montelukast (SINGULAIR) 10 mg tablet [Pharmacy Med Name: Montelukast Sodium Oral Tablet 10 MG] 90 tablet 0     Sig: TAKE ONE TABLET BY MOUTH ONE TIME DAILY       There is no refill protocol information for this order        hydroCHLOROthiazide (HYDRODIURIL) 12.5 MG tablet [Pharmacy Med Name: hydroCHLOROthiazide Oral Tablet 12.5 MG] 90 tablet 0     Sig: TAKE ONE TABLET BY MOUTH ONE TIME DAILY       Diuretics/Combination Diuretics Refill Protocol  Failed - 6/27/2020  9:15 AM        Failed - Serum Potassium in past 12 months      No results found for: LN-POTASSIUM          Failed - Serum Sodium in past 12 months      No results found for: LN-SODIUM          Failed - Serum Creatinine in past 12 months      Creatinine   Date Value Ref Range Status   04/12/2019 0.76 0.70 - 1.30 mg/dL Final             Passed - Visit with PCP or prescribing provider visit in past 12 months     Last office visit with prescriber/PCP: 2/19/2020 Rajiv Figueroa MD OR same dept: 2/19/2020 Rajiv Figueroa MD OR same specialty: 2/19/2020 Rajiv Figueroa MD  Last physical: 1/3/2018 Last MTM visit: Visit date not found   Next visit within 3 mo: Visit date not found  Next physical within 3 mo: Visit date not found  Prescriber OR PCP: Rajiv Figueroa MD  Last diagnosis associated with med order: 1. Mild persistent asthma without complication  - montelukast (SINGULAIR) 10 mg tablet [Pharmacy Med Name: Montelukast Sodium  Oral Tablet 10 MG]; TAKE ONE TABLET BY MOUTH ONE TIME DAILY   Dispense: 90 tablet; Refill: 0    2. Chronic seasonal allergic rhinitis due to pollen  - montelukast (SINGULAIR) 10 mg tablet [Pharmacy Med Name: Montelukast Sodium Oral Tablet 10 MG]; TAKE ONE TABLET BY MOUTH ONE TIME DAILY   Dispense: 90 tablet; Refill: 0    3. Essential hypertension with goal blood pressure less than 140/90  - hydroCHLOROthiazide (HYDRODIURIL) 12.5 MG tablet [Pharmacy Med Name: hydroCHLOROthiazide Oral Tablet 12.5 MG]; TAKE ONE TABLET BY MOUTH ONE TIME DAILY   Dispense: 90 tablet; Refill: 0    If protocol passes may refill for 12 months if within 3 months of last provider visit (or a total of 15 months).             Passed - Blood pressure on file in past 12 months     BP Readings from Last 1 Encounters:   02/19/20 136/70

## 2021-06-09 NOTE — PROGRESS NOTES
Assessment: /    Plan:    1. Essential hypertension with goal blood pressure less than 140/90  lisinopril (PRINIVIL,ZESTRIL) 20 MG tablet       Recheck in 6 months.      Subjective:    HPI:  Breezy Bill is a 56-year-old male presenting for recheck on hypertension.  He has no side effects from the increased dose of lisinopril.  Hemoglobin A1c was 5.4.       Review of Systems:  No chest pain, cough or dyspnea.      Current Outpatient Prescriptions   Medication Sig Dispense Refill     cabergoline (DOSTINEX) 0.5 mg tablet TAKE 1/2 TABLET BY MOUTH TWICE WEEKLY 8 tablet 4     cholecalciferol, vitamin D3, (VITAMIN D3) 2,000 unit cap Take 1 capsule by mouth daily.       dorzolamide-timolol (COSOPT) 22.3-6.8 mg/mL ophthalmic solution   10     EPINEPHrine (EPIPEN 2-CHLOÉ) 0.3 mg/0.3 mL (1:1,000) atIn Inject 0.3 mg into the shoulder, thigh, or buttocks once. Use as directed       FLOVENT  mcg/actuation inhaler INHALE ONE PUFF BY MOUTH TWICE DAILY 12 Inhaler 5     flunisolide (NASALIDE) 25 mcg (0.025 %) Spry USE 1 SPRAY IN EACH NOSTRIL TWICE DAILY. 25 mL 5     hydrochlorothiazide (HYDRODIURIL) 12.5 MG tablet Take 1 tablet (12.5 mg total) by mouth daily. 30 tablet 11     ketotifen (ZADITOR) 0.025 % ophthalmic solution 1 drop as needed.       latanoprost (XALATAN) 0.005 % ophthalmic solution   10     loratadine (CLARITIN) 10 mg tablet Take 10 mg by mouth daily.       montelukast (SINGULAIR) 10 mg tablet Take 1 tablet (10 mg total) by mouth daily. 90 tablet 0     MULTIVITAMIN ORAL Take 1 tablet by mouth daily.       PROAIR HFA 90 mcg/actuation inhaler INHALE ONE OR TWO PUFFS BY MOUTH EVERY FOUR HOURS AS NEEDED  8.5 g 5     venlafaxine (EFFEXOR-XR) 150 MG 24 hr capsule Take 1 capsule (150 mg total) by mouth daily. 90 capsule 3     lisinopril (PRINIVIL,ZESTRIL) 20 MG tablet Take 1 tablet (20 mg total) by mouth daily. 90 tablet 3     No current facility-administered medications for this visit.          Objective:    Vitals:     "03/06/17 0816 03/06/17 0822 03/06/17 0837   BP: 146/80 146/80 126/84   Patient Site: Left Arm Left Arm    Patient Position: Sitting Sitting    Cuff Size: Adult Large Adult Large    Pulse: 74     Resp: 19     Temp: 98.3  F (36.8  C)     TempSrc: Oral     SpO2: 96%     Weight: (!) 255 lb 0.6 oz (115.7 kg)     Height: 5' 8.7\" (1.745 m)         Gen:  NAD, VSS  Lungs:  normal  Heart:  normal          ADDITIONAL HISTORY SUMMARIZED (2): None.  DECISION TO OBTAIN EXTRA INFORMATION (1): None.   RADIOLOGY TESTS (1): None.  LABS (1): None.  MEDICINE TESTS (1): None.  INDEPENDENT REVIEW (2 each): None.     Total Data Points: 0    "

## 2021-06-11 NOTE — PROGRESS NOTES
Assessment: /    Plan:    1. Essential hypertension with goal blood pressure less than 140/90  Basic Metabolic Panel   2. Hyperprolactinemia (H)  cabergoline (DOSTINEX) 0.5 mg tablet    Prolactin   3. Pituitary adenoma (H)  cabergoline (DOSTINEX) 0.5 mg tablet   4. Testicular hypofunction  cabergoline (DOSTINEX) 0.5 mg tablet       Recheck in 6 months.      Subjective:    HPI:  Breezy Bill is a 60-year-old male presenting for follow-up on hypertension.    He has hyperprolactinemia, taking cabergoline.       Review of Systems:  No fever or cough.      Current Outpatient Medications   Medication Sig Dispense Refill     cabergoline (DOSTINEX) 0.5 mg tablet Take 1/2 tablet twice weekly 8 tablet 11     cholecalciferol, vitamin D3, (VITAMIN D3) 2,000 unit cap Take 1 capsule by mouth daily.       dorzolamide-timolol (COSOPT) 22.3-6.8 mg/mL ophthalmic solution   10     EPINEPHrine (EPIPEN) 0.3 mg/0.3 mL atIn   0     fluticasone propionate (FLOVENT HFA) 110 mcg/actuation inhaler INHALE ONE PUFF BY MOUTH TWICE DAILY 1 Inhaler 5     hydroCHLOROthiazide (HYDRODIURIL) 12.5 MG tablet TAKE ONE TABLET BY MOUTH ONE TIME DAILY  90 tablet 3     ketotifen (ZADITOR) 0.025 % ophthalmic solution 1 drop as needed.       latanoprost (XALATAN) 0.005 % ophthalmic solution   10     lisinopril (PRINIVIL,ZESTRIL) 40 MG tablet Take 1 tablet (40 mg total) by mouth daily. 90 tablet 3     loratadine (CLARITIN) 10 mg tablet Take 10 mg by mouth daily.       montelukast (SINGULAIR) 10 mg tablet TAKE ONE TABLET BY MOUTH ONE TIME DAILY  90 tablet 3     MULTIVITAMIN ORAL Take 1 tablet by mouth daily.       PROAIR HFA 90 mcg/actuation inhaler INHALE ONE OR TWO PUFFS BY MOUTH EVERY FOUR HOURS AS NEEDED  8.5 g 6     venlafaxine (EFFEXOR-XR) 150 MG 24 hr capsule TAKE ONE CAPSULE BY MOUTH ONE TIME DAILY  90 capsule 3     No current facility-administered medications for this visit.          Objective:    Vitals:    08/26/20 0909   BP: 128/66   Pulse: 67   Resp:  24   Temp: 98.9  F (37.2  C)   SpO2: 95%       Gen:  NAD, VSS  Lungs:  normal  Heart:  normal          ADDITIONAL HISTORY SUMMARIZED (2): None.  DECISION TO OBTAIN EXTRA INFORMATION (1): None.   RADIOLOGY TESTS (1): None.  LABS (1): ordered.  MEDICINE TESTS (1): None.  INDEPENDENT REVIEW (2 each): None.     Total Data Points: 1

## 2021-06-12 NOTE — TELEPHONE ENCOUNTER
RN cannot approve Refill Request    RN can NOT refill this medication PCP messaged that patient is overdue for Labs. Last office visit: 8/26/2020 Rajiv Figueroa MD Last Physical: 1/3/2018 Last MTM visit: Visit date not found Last visit same specialty: 8/26/2020 Rajiv Figueroa MD.  Next visit within 3 mo: Visit date not found  Next physical within 3 mo: Visit date not found      Virgie Arenas, [facility] Triage/Med Refill 11/8/2020    Requested Prescriptions   Pending Prescriptions Disp Refills     venlafaxine (EFFEXOR-XR) 150 MG 24 hr capsule [Pharmacy Med Name: Venlafaxine HCl ER Oral Capsule Extended Release 24 Hour 150 MG] 90 capsule 0     Sig: TAKE ONE CAPSULE BY MOUTH ONE TIME DAILY       Venlafaxine/Desvenlafaxine Refill Protocol Failed - 11/7/2020  9:19 AM        Failed - LFT or AST or ALT in last year     Albumin   Date Value Ref Range Status   10/13/2016 4.3 3.5 - 5.0 g/dL Final     Bilirubin, Total   Date Value Ref Range Status   10/13/2016 0.5 0.0 - 1.0 mg/dL Final     Bilirubin, Direct   Date Value Ref Range Status   10/13/2016 0.2 <=0.5 mg/dL Final     Alkaline Phosphatase   Date Value Ref Range Status   10/13/2016 89 45 - 120 U/L Final     AST   Date Value Ref Range Status   10/13/2016 23 0 - 40 U/L Final     ALT   Date Value Ref Range Status   10/13/2016 28 0 - 45 U/L Final     Protein, Total   Date Value Ref Range Status   10/13/2016 7.7 6.0 - 8.0 g/dL Final                Failed - Fasting lipid cascade in last year     Cholesterol   Date Value Ref Range Status   04/22/2016 195 <=199 mg/dL Final     Triglycerides   Date Value Ref Range Status   04/22/2016 67 <=149 mg/dL Final     HDL Cholesterol   Date Value Ref Range Status   04/22/2016 55 >=40 mg/dL Final     LDL Calculated   Date Value Ref Range Status   04/22/2016 127 <=129 mg/dL Final     Patient Fasting > 8hrs?   Date Value Ref Range Status   04/22/2016 Yes  Final             Passed - PCP or prescribing provider visit in  last year     Last office visit with prescriber/PCP: 8/26/2020 Rajiv Figueroa MD OR same dept: 8/26/2020 Rajiv Figueroa MD OR same specialty: 8/26/2020 Rajiv Figueroa MD  Last physical: 1/3/2018 Last MTM visit: Visit date not found   Next visit within 3 mo: Visit date not found  Next physical within 3 mo: Visit date not found  Prescriber OR PCP: Rajiv Figueroa MD  Last diagnosis associated with med order: 1. Vascular dementia with depressed mood (H)  - venlafaxine (EFFEXOR-XR) 150 MG 24 hr capsule [Pharmacy Med Name: Venlafaxine HCl ER Oral Capsule Extended Release 24 Hour 150 MG]; TAKE ONE CAPSULE BY MOUTH ONE TIME DAILY   Dispense: 90 capsule; Refill: 0    If protocol passes may refill for 12 months if within 3 months of last provider visit (or a total of 15 months).             Passed - Blood Pressure in last year     BP Readings from Last 1 Encounters:   08/26/20 128/66

## 2021-06-13 NOTE — PROGRESS NOTES
Assessment: /    Plan:    1. Essential hypertension with goal blood pressure less than 140/90     2. Need for vaccination  Influenza, Seasonal,Quad Inj, 36+ MOS       Recheck in 6 months.      Subjective:    HPI:  Breezy Bill is a 57-year-old male presenting for follow-up on hypertension.  One month ago he developed sinusitis in addition to worsening of his allergies.  This caused his asthma to be worse.  Sinusitis has resolved.  Asthma is much better now.    Potassium and creatinine were normal in April.      Social Hx: His wife was diagnosed with breast cancer in December.  She has finished radiation now.    Review of Systems: No fever, cough, chest pain.      Current Outpatient Prescriptions   Medication Sig Dispense Refill     cabergoline (DOSTINEX) 0.5 mg tablet TAKE 1/2 TABLET BY MOUTH TWICE WEEKLY 8 tablet 4     cholecalciferol, vitamin D3, (VITAMIN D3) 2,000 unit cap Take 1 capsule by mouth daily.       dorzolamide-timolol (COSOPT) 22.3-6.8 mg/mL ophthalmic solution   10     EPINEPHrine (EPIPEN) 0.3 mg/0.3 mL atIn   0     FLOVENT  mcg/actuation inhaler INHALE ONE PUFF BY MOUTH TWICE DAILY 12 Inhaler 5     flunisolide (NASALIDE) 25 mcg (0.025 %) Spry USE 1 SPRAY IN EACH NOSTRIL TWICE DAILY. 25 mL 5     hydroCHLOROthiazide (HYDRODIURIL) 12.5 MG tablet TAKE 1 TABLET (12.5 MG TOTAL) BY MOUTH DAILY. 90 tablet 3     ketotifen (ZADITOR) 0.025 % ophthalmic solution 1 drop as needed.       latanoprost (XALATAN) 0.005 % ophthalmic solution   10     lisinopril (PRINIVIL,ZESTRIL) 20 MG tablet Take 1 tablet (20 mg total) by mouth daily. 90 tablet 3     loratadine (CLARITIN) 10 mg tablet Take 10 mg by mouth daily.       montelukast (SINGULAIR) 10 mg tablet TAKE 1 TABLET BY MOUTH ONCE DAILY. 90 tablet 3     MULTIVITAMIN ORAL Take 1 tablet by mouth daily.       PROAIR HFA 90 mcg/actuation inhaler INHALE 1-2 PUFFS EVERY 4 (FOUR) HOURS AS NEEDED. 8.5 Inhaler 11     venlafaxine (EFFEXOR-XR) 150 MG 24 hr capsule Take 1  capsule (150 mg total) by mouth daily. 90 capsule 3     No current facility-administered medications for this visit.          Objective:    Vitals:    09/06/17 0833   BP: 136/84   Pulse:    Resp:    Temp:    SpO2:        Gen:  NAD, VSS  Ears normal  Throat normal  Lungs:  normal  Heart:  normal          ADDITIONAL HISTORY SUMMARIZED (2): None.  DECISION TO OBTAIN EXTRA INFORMATION (1): None.   RADIOLOGY TESTS (1): None.  LABS (1): reviewed April labs.  MEDICINE TESTS (1): None.  INDEPENDENT REVIEW (2 each): None.     Total Data Points: 1

## 2021-06-13 NOTE — TELEPHONE ENCOUNTER
Refill Approved    Rx renewed per Medication Renewal Policy. Medication was last renewed on 12/16/19.    Rianna Hernandez, [facility] Triage/Med Refill 12/9/2020     Requested Prescriptions   Pending Prescriptions Disp Refills     lisinopriL (PRINIVIL,ZESTRIL) 40 MG tablet [Pharmacy Med Name: Lisinopril Oral Tablet 40 MG] 90 tablet 0     Sig: TAKE ONE TABLET BY MOUTH ONE TIME DAILY       Ace Inhibitors Refill Protocol Passed - 12/8/2020  9:10 AM        Passed - PCP or prescribing provider visit in past 12 months       Last office visit with prescriber/PCP: 8/26/2020 Rajiv Figueroa MD OR same dept: 8/26/2020 Rajiv Figueroa MD OR same specialty: 8/26/2020 aRjiv Figueroa MD  Last physical: 1/3/2018 Last MTM visit: Visit date not found   Next visit within 3 mo: Visit date not found  Next physical within 3 mo: Visit date not found  Prescriber OR PCP: Rajiv Figueroa MD  Last diagnosis associated with med order: 1. Essential hypertension with goal blood pressure less than 140/90  - lisinopriL (PRINIVIL,ZESTRIL) 40 MG tablet [Pharmacy Med Name: Lisinopril Oral Tablet 40 MG]; TAKE ONE TABLET BY MOUTH ONE TIME DAILY   Dispense: 90 tablet; Refill: 0    If protocol passes may refill for 12 months if within 3 months of last provider visit (or a total of 15 months).             Passed - Serum Potassium in past 12 months     Lab Results   Component Value Date    Potassium 4.7 08/26/2020             Passed - Blood pressure filed in past 12 months     BP Readings from Last 1 Encounters:   08/26/20 128/66             Passed - Serum Creatinine in past 12 months     Creatinine   Date Value Ref Range Status   08/26/2020 0.70 0.70 - 1.30 mg/dL Final

## 2021-06-15 PROBLEM — I10 ESSENTIAL HYPERTENSION WITH GOAL BLOOD PRESSURE LESS THAN 140/90: Status: ACTIVE | Noted: 2017-02-06

## 2021-06-15 NOTE — PROGRESS NOTES
Assessment/Plan:      Visit for Preoperative Exam.      1. Preop examination     2. Cloudy posterior capsule, left           Patient approved for surgery with general or local anesthesia. Copy of the pre-op was given to the patient to bring along on the day of surgery. Proceed with proposed surgery without additional clinical clarifications.     Subjective:     Scheduled Procedure: Yag Capsulotomy - Left Eye  Surgery Date:  01/04/18  Surgery Location:  [facility]  Surgeon:  Dr. Marin    Had prior cataract surgery, has developed clouding of left posterior capsule, for a few years.  Worsening.  Asthma has been well controlled.  HTN well controlled with medication.    Current Outpatient Prescriptions   Medication Sig Dispense Refill     cabergoline (DOSTINEX) 0.5 mg tablet TAKE 1/2 TABLET BY MOUTH TWICE WEEKLY 8 tablet 4     cholecalciferol, vitamin D3, (VITAMIN D3) 2,000 unit cap Take 1 capsule by mouth daily.       dorzolamide-timolol (COSOPT) 22.3-6.8 mg/mL ophthalmic solution   10     EPINEPHrine (EPIPEN) 0.3 mg/0.3 mL atIn   0     FLOVENT  mcg/actuation inhaler INHALE ONE PUFF BY MOUTH TWICE DAILY 12 Inhaler 5     flunisolide (NASALIDE) 25 mcg (0.025 %) Spry USE 1 SPRAY IN EACH NOSTRIL TWICE DAILY. 25 mL 5     hydroCHLOROthiazide (HYDRODIURIL) 12.5 MG tablet TAKE 1 TABLET (12.5 MG TOTAL) BY MOUTH DAILY. 90 tablet 3     ketotifen (ZADITOR) 0.025 % ophthalmic solution 1 drop as needed.       latanoprost (XALATAN) 0.005 % ophthalmic solution   10     lisinopril (PRINIVIL,ZESTRIL) 20 MG tablet Take 1 tablet (20 mg total) by mouth daily. 90 tablet 3     loratadine (CLARITIN) 10 mg tablet Take 10 mg by mouth daily.       montelukast (SINGULAIR) 10 mg tablet TAKE 1 TABLET BY MOUTH ONCE DAILY. 90 tablet 3     MULTIVITAMIN ORAL Take 1 tablet by mouth daily.       PROAIR HFA 90 mcg/actuation inhaler INHALE 1-2 PUFFS EVERY 4 (FOUR) HOURS AS NEEDED. 8.5 Inhaler 11     venlafaxine (EFFEXOR-XR) 150 MG 24 hr  capsule Take 1 capsule (150 mg total) by mouth daily. 90 capsule 3     No current facility-administered medications for this visit.        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     Sciatica     Obesity     Vitamin D Deficiency     Moderate Recurrent Major Depression     Mild persistent asthma     Allergic Rhinitis     Hypogonadism in male     Hyperprolactinemia     Pituitary microadenoma     Essential hypertension with goal blood pressure less than 140/90     Kidney stone       Past Medical History:   Diagnosis Date     Allergies     Created by Conversion      Anaphylaxis Due To Bee Venom     Created by Conversion      Asthma      Benign Skin Neoplasm     Created by Conversion      Depression      HLD (hyperlipidemia)      HTN (hypertension)      Kidney stone        Social History     Social History     Marital status:      Spouse name: N/A     Number of children: N/A     Years of education: N/A     Occupational History     Artist      Social History Main Topics     Smoking status: Never Smoker     Smokeless tobacco: Never Used     Alcohol use 1.8 oz/week     1 Glasses of wine, 1 Cans of beer, 1 Shots of liquor per week      Comment: Ocasional     Drug use: No     Sexual activity: Yes     Partners: Female     Other Topics Concern     Not on file     Social History Narrative       Past Surgical History:   Procedure Laterality Date     PA REVISE MEDIAN N/CARPAL TUNNEL SURG      Description:  "Neuroplasty Decompression Median Nerve At Carpal Tunnel;  Recorded: 06/12/2008;     OR TRABECULOPLASTY BY LASER SURGERY      Description: Anterior Chamber Laser Trabeculoplasty;  Recorded: 06/12/2008;  Comments: x2 for ocular HTN       History of Present Illness  Recent Health  Fever: no  Chills: no  Fatigue: no  Chest Pain: no  Cough: no  Dyspnea: no  Urinary Frequency: no  Nausea: no  Vomiting: no  Diarrhea: no  Abdominal Pain: no  Easy Bruising: no  Lower Extremity Swelling: no  Poor Exercise Tolerance: no    Most recent Health Maintenance Visit:  1 year(s) ago    Pertinent History  Prior Anesthesia: yes  Previous Anesthesia Reaction:  no  Diabetes: no  Cardiovascular Disease: no  Pulmonary Disease: no  Renal Disease: no  GI Disease: no  Sleep Apnea: no  Thromboembolic Problems: no  Clotting Disorder: no  Bleeding Disorder: no  Transfusion Reaction: no  Impaired Immunity: no  Steroid use in the last 6 months: no  Frequent Aspirin use: no    Family history of None    Social history of None    After surgery, the patient plans to recover at home with family.    Review of Systems  Review of Systems   Constitutional: Negative.    HENT: Negative.    Respiratory: Negative.    Cardiovascular: Negative.    Gastrointestinal: Negative.              Objective:         Vitals:    01/03/18 1256 01/03/18 1301 01/03/18 1326   BP: 142/80 140/78 134/76   Pulse: 74     Resp: 20     Temp: 98.3  F (36.8  C)     TempSrc: Oral     SpO2: 95%     Weight: (!) 258 lb 8 oz (117.3 kg)     Height: 5' 8.7\" (1.745 m)         Physical Exam:  Physical Exam     Eyes: EOM full, pupils normal, conjunctivae normal  Ears: TM's and canals normal  Oropharynx: normal  Neck: supple without adenopathy or thyromegaly  Lungs: normal  Heart: regular rhythm, normal rate, no murmur  Abdomen: no HSM, mass or tenderness.  Diastasis recti, epigastric  Extremities: FROM, normal strength and sensation    "

## 2021-06-15 NOTE — PROGRESS NOTES
"    Assessment & Plan     Regino was seen today for follow-up.    Diagnoses and all orders for this visit:    Essential hypertension with goal blood pressure less than 140/90      He will increase walking in order to reduce blood pressure.             BMI:   Estimated body mass index is 38.75 kg/m  as calculated from the following:    Height as of this encounter: 5' 8.75\" (1.746 m).    Weight as of this encounter: 260 lb 8 oz (118.2 kg).       Return in about 2 months (around 4/26/2021) for BP.    Rajiv Figueroa MD  [facility]   Regino Bill is 60 y.o. and presents today for the following health issues   HPI     Blood pressure has usually been about 135/79 at home.  He has not been able to do much walking during the winter.    Asthma has been well controlled.      Current Outpatient Medications   Medication Sig Dispense Refill     cabergoline (DOSTINEX) 0.5 mg tablet Take 1/2 tablet twice weekly 8 tablet 11     cholecalciferol, vitamin D3, (VITAMIN D3) 2,000 unit cap Take 1 capsule by mouth daily.       dorzolamide-timolol (COSOPT) 22.3-6.8 mg/mL ophthalmic solution   10     EPINEPHrine (EPIPEN) 0.3 mg/0.3 mL atIn   0     fluticasone propionate (FLOVENT HFA) 110 mcg/actuation inhaler INHALE ONE PUFF BY MOUTH TWICE DAILY 1 Inhaler 5     hydroCHLOROthiazide (HYDRODIURIL) 12.5 MG tablet TAKE ONE TABLET BY MOUTH ONE TIME DAILY  90 tablet 3     latanoprost (XALATAN) 0.005 % ophthalmic solution   10     lisinopriL (PRINIVIL,ZESTRIL) 40 MG tablet TAKE ONE TABLET BY MOUTH ONE TIME DAILY  90 tablet 2     loratadine (CLARITIN) 10 mg tablet Take 10 mg by mouth daily.       montelukast (SINGULAIR) 10 mg tablet TAKE ONE TABLET BY MOUTH ONE TIME DAILY  90 tablet 3     MULTIVITAMIN ORAL Take 1 tablet by mouth daily.       PROAIR HFA 90 mcg/actuation inhaler INHALE ONE OR TWO PUFFS BY MOUTH EVERY FOUR HOURS AS NEEDED  8.5 g 6     venlafaxine (EFFEXOR-XR) 150 MG 24 hr capsule TAKE ONE " "CAPSULE BY MOUTH ONE TIME DAILY  90 capsule 1     ketotifen (ZADITOR) 0.025 % ophthalmic solution 1 drop as needed.       No current facility-administered medications for this visit.          Review of Systems  No fever or cough.      Objective    /84 (Patient Site: Right Arm)   Pulse 84   Temp 98.7  F (37.1  C) (Oral)   Ht 5' 8.75\" (1.746 m)   Wt (!) 260 lb 8 oz (118.2 kg)   SpO2 95%   BMI 38.75 kg/m    Body mass index is 38.75 kg/m .  Physical Exam  Heart normal   Lungs normal                "

## 2021-06-16 PROBLEM — J45.30 MILD PERSISTENT ASTHMA WITHOUT COMPLICATION: Status: ACTIVE | Noted: 2018-06-05

## 2021-06-16 PROBLEM — J30.1 CHRONIC SEASONAL ALLERGIC RHINITIS DUE TO POLLEN: Status: ACTIVE | Noted: 2018-06-05

## 2021-06-16 PROBLEM — N20.0 KIDNEY STONE: Status: ACTIVE | Noted: 2018-01-03

## 2021-06-16 NOTE — PROGRESS NOTES
Assessment: /    Plan:    1. Essential hypertension with goal blood pressure less than 140/90  Basic Metabolic Panel       Walk daily.  Recheck in 3 months.      Subjective:    HPI:  Breezy Bill is a 57-year-old male presenting for follow-up on hypertension.  He has not been walking very much this winter.    Social Hx: His mother is now living with him and his wife.    Review of Systems: No fever or cough.  He ate a fig bar this morning.      Current Outpatient Prescriptions   Medication Sig Dispense Refill     cabergoline (DOSTINEX) 0.5 mg tablet TAKE 1/2 TABLET BY MOUTH TWICE WEEKLY 12 tablet 3     cholecalciferol, vitamin D3, (VITAMIN D3) 2,000 unit cap Take 1 capsule by mouth daily.       dorzolamide-timolol (COSOPT) 22.3-6.8 mg/mL ophthalmic solution   10     EPINEPHrine (EPIPEN) 0.3 mg/0.3 mL atIn   0     FLOVENT  mcg/actuation inhaler INHALE ONE PUFF BY MOUTH TWICE DAILY 12 Inhaler 5     flunisolide (NASALIDE) 25 mcg (0.025 %) Spry USE 1 SPRAY IN EACH NOSTRIL TWICE DAILY. 25 mL 5     hydroCHLOROthiazide (HYDRODIURIL) 12.5 MG tablet TAKE 1 TABLET (12.5 MG TOTAL) BY MOUTH DAILY. 90 tablet 3     ketotifen (ZADITOR) 0.025 % ophthalmic solution 1 drop as needed.       latanoprost (XALATAN) 0.005 % ophthalmic solution   10     loratadine (CLARITIN) 10 mg tablet Take 10 mg by mouth daily.       montelukast (SINGULAIR) 10 mg tablet TAKE 1 TABLET BY MOUTH ONCE DAILY. 90 tablet 3     MULTIVITAMIN ORAL Take 1 tablet by mouth daily.       PROAIR HFA 90 mcg/actuation inhaler INHALE 1-2 PUFFS EVERY 4 (FOUR) HOURS AS NEEDED. 8.5 Inhaler 11     venlafaxine (EFFEXOR-XR) 150 MG 24 hr capsule TAKE 1 CAPSULE (150 MG TOTAL) BY MOUTH DAILY. 90 capsule 3     No current facility-administered medications for this visit.          Objective:    Vitals:    03/06/18 0810 03/06/18 0815 03/06/18 0847   BP: (!) 152/94 148/86 136/88   Patient Site: Left Arm Left Arm    Patient Position: Sitting Sitting    Cuff Size: Adult Large Adult  "Large    Pulse: 72     Resp: 20     Temp: 98.4  F (36.9  C)     TempSrc: Oral     SpO2: 95%     Weight: (!) 256 lb 12 oz (116.5 kg)     Height: 5' 8.7\" (1.745 m)         Gen:  NAD, VSS  Lungs:  normal  Heart:  normal          ADDITIONAL HISTORY SUMMARIZED (2): None.  DECISION TO OBTAIN EXTRA INFORMATION (1): None.   RADIOLOGY TESTS (1): None.  LABS (1): BMP.  MEDICINE TESTS (1): None.  INDEPENDENT REVIEW (2 each): None.     Total Data Points: 1    "

## 2021-06-17 NOTE — TELEPHONE ENCOUNTER
RN cannot approve Refill Request    RN can NOT refill this medication PCP messaged that patient is overdue for Labs. Last office visit: 4/30/2021 Rajiv Figueroa MD Last Physical: 1/3/2018 Last MTM visit: Visit date not found Last visit same specialty: 4/30/2021 Rajiv Figueroa MD.  Next visit within 3 mo: Visit date not found  Next physical within 3 mo: Visit date not found      Yadi Alanis, [facility] Triage/Med Refill 5/4/2021    Requested Prescriptions   Pending Prescriptions Disp Refills     venlafaxine (EFFEXOR-XR) 150 MG 24 hr capsule [Pharmacy Med Name: Venlafaxine HCl ER Oral Capsule Extended Release 24 Hour 150 MG] 90 capsule 0     Sig: TAKE ONE CAPSULE BY MOUTH ONE TIME DAILY       Venlafaxine/Desvenlafaxine Refill Protocol Failed - 5/4/2021  8:03 AM        Failed - LFT or AST or ALT in last year     Albumin   Date Value Ref Range Status   10/13/2016 4.3 3.5 - 5.0 g/dL Final     Bilirubin, Total   Date Value Ref Range Status   10/13/2016 0.5 0.0 - 1.0 mg/dL Final     Bilirubin, Direct   Date Value Ref Range Status   10/13/2016 0.2 <=0.5 mg/dL Final     Alkaline Phosphatase   Date Value Ref Range Status   10/13/2016 89 45 - 120 U/L Final     AST   Date Value Ref Range Status   10/13/2016 23 0 - 40 U/L Final     ALT   Date Value Ref Range Status   10/13/2016 28 0 - 45 U/L Final     Protein, Total   Date Value Ref Range Status   10/13/2016 7.7 6.0 - 8.0 g/dL Final                Failed - Fasting lipid cascade in last year     Cholesterol   Date Value Ref Range Status   04/22/2016 195 <=199 mg/dL Final     Triglycerides   Date Value Ref Range Status   04/22/2016 67 <=149 mg/dL Final     HDL Cholesterol   Date Value Ref Range Status   04/22/2016 55 >=40 mg/dL Final     LDL Calculated   Date Value Ref Range Status   04/22/2016 127 <=129 mg/dL Final     Patient Fasting > 8hrs?   Date Value Ref Range Status   04/22/2016 Yes  Final             Passed - PCP or prescribing provider visit in  last year     Last office visit with prescriber/PCP: 4/30/2021 Rajiv Figueroa MD OR same dept: 4/30/2021 Rajiv Figueroa MD OR same specialty: 4/30/2021 Rajiv Figueroa MD  Last physical: 1/3/2018 Last MTM visit: Visit date not found   Next visit within 3 mo: Visit date not found  Next physical within 3 mo: Visit date not found  Prescriber OR PCP: Rajiv Figueroa MD  Last diagnosis associated with med order: 1. Vascular dementia with depressed mood (H)  - venlafaxine (EFFEXOR-XR) 150 MG 24 hr capsule [Pharmacy Med Name: Venlafaxine HCl ER Oral Capsule Extended Release 24 Hour 150 MG]; TAKE ONE CAPSULE BY MOUTH ONE TIME DAILY   Dispense: 90 capsule; Refill: 0    If protocol passes may refill for 12 months if within 3 months of last provider visit (or a total of 15 months).             Passed - Blood Pressure in last year     BP Readings from Last 1 Encounters:   04/30/21 136/80

## 2021-06-17 NOTE — PROGRESS NOTES
Progress Note    Reason for Visit:  Chief Complaint     Hypogonadism          Progress Note:    HPI:        This pleasant 56-year-old male patient is here for follow-up he has a pituitary microadenoma.     He is currently on Dostinex 0.25 mg twice a week he has been on it for 2 years.  He is tolerating that quite well with no side effects.     When he tried to go off it his testosterone went down.  And prolactin went up.        Review of Systems:    Nervous System: No headache, dizziness, fainting or memory loss. No tingling sensation of hand or feet.  Ears: No hearing loss or ringing in the ears  Eyes: No blurring of vision, redness, itching or dryness.  Nose: No nosebleed or loss of smell  Mouth: No mouth sores or loss of taste  Throat: No hoarseness or difficulty swallowing  Neck: No enlarged thyroid or lymph nodes.  Heart: No chest pain, palpitation or irregular heartbeat. No swelling of hands or feet  Lungs: No shortness of breath, cough, night sweats, wheezing or hemoptysis.  Gastrointestinal: No nausea or vomiting, constipation or diarrhea.  No acid reflux, abdominal pain or blood in stools.  Kidney/Bladdr: No polyuria, polydipsia, nocturia or hematuria.  Genital/Sexual: No loss of libido  Skin: No rash, hair loss or hirsutism.  No abnormal striae  Muscles/Joints/Bones: No morning stiffness, muscle aches and pain or loss of height.    Current Medications:  Current Outpatient Prescriptions   Medication Sig     cabergoline (DOSTINEX) 0.5 mg tablet TAKE 1/2 TABLET BY MOUTH TWICE WEEKLY     cholecalciferol, vitamin D3, (VITAMIN D3) 2,000 unit cap Take 1 capsule by mouth daily.     dorzolamide-timolol (COSOPT) 22.3-6.8 mg/mL ophthalmic solution      EPINEPHrine (EPIPEN) 0.3 mg/0.3 mL atIn      FLOVENT  mcg/actuation inhaler INHALE ONE PUFF BY MOUTH TWICE DAILY     flunisolide (NASALIDE) 25 mcg (0.025 %) Spry USE 1 SPRAY IN EACH NOSTRIL TWICE DAILY.     hydroCHLOROthiazide (HYDRODIURIL) 12.5 MG tablet TAKE 1  TABLET (12.5 MG TOTAL) BY MOUTH DAILY.     ketotifen (ZADITOR) 0.025 % ophthalmic solution 1 drop as needed.     latanoprost (XALATAN) 0.005 % ophthalmic solution      lisinopril (PRINIVIL,ZESTRIL) 20 MG tablet TAKE 1 TABLET BY MOUTH DAILY.     loratadine (CLARITIN) 10 mg tablet Take 10 mg by mouth daily.     montelukast (SINGULAIR) 10 mg tablet TAKE 1 TABLET BY MOUTH ONCE DAILY.     MULTIVITAMIN ORAL Take 1 tablet by mouth daily.     PROAIR HFA 90 mcg/actuation inhaler INHALE 1-2 PUFFS EVERY 4 (FOUR) HOURS AS NEEDED.     venlafaxine (EFFEXOR-XR) 150 MG 24 hr capsule TAKE 1 CAPSULE (150 MG TOTAL) BY MOUTH DAILY.       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 He has never used smokeless tobacco. He reports that he drinks about 1.8 oz of alcohol per week  He reports that he does not use illicit drugs.   reports that he has never smoked. He has never used smokeless tobacco. He reports that he drinks about 1.8 oz of alcohol per week  He reports that he does not use illicit drugs.  History   Smoking Status     Never Smoker   Smokeless Tobacco     Never Used      reports that he has never smoked. He has never used smokeless tobacco. He reports that he drinks about 1.8 oz of alcohol per week  He reports that he does not use illicit drugs.  History   Sexual Activity     Sexual activity: Yes     Partners: Female     Past Medical History:   Diagnosis Date     Allergies     Created by Conversion      Anaphylaxis Due To Bee Venom     Created by Conversion      Asthma      Benign Skin  "Neoplasm     Created by Conversion      Depression      HLD (hyperlipidemia)      HTN (hypertension)      Kidney stone      Family History   Problem Relation Age of Onset     Heart disease Mother      Cancer Father      lung     Urolithiasis Sister      Diabetes Sister      Clotting disorder Neg Hx      Gout Neg Hx      Past Medical History:   Diagnosis Date     Allergies     Created by Conversion      Anaphylaxis Due To Bee Venom     Created by Conversion      Asthma      Benign Skin Neoplasm     Created by Conversion      Depression      HLD (hyperlipidemia)      HTN (hypertension)      Kidney stone      Past Surgical History:   Procedure Laterality Date     IL REVISE MEDIAN N/CARPAL TUNNEL SURG      Description: Neuroplasty Decompression Median Nerve At Carpal Tunnel;  Recorded: 06/12/2008;     IL TRABECULOPLASTY BY LASER SURGERY      Description: Anterior Chamber Laser Trabeculoplasty;  Recorded: 06/12/2008;  Comments: x2 for ocular HTN       Vitals   height is 5' 8.7\" (1.745 m) and weight is 254 lb 8 oz (115.4 kg) (abnormal). His blood pressure is 122/88.         Exam  General appearance: The patient looked well, not in acute distress.  Eyes: no evidence of thyroid eye disease.   Retinal exam: No evidence of diabetic retinopathy.  Mouth and Throat: Normal  Neck: No evidence of thyromegaly, enlarged lymph node or tenderness  Chest: Trachea is central. Chest is clear to auscultation and percussion. Breat sounds are normal.  Cardiovascular exam: JVP is not raised. Heart sounds are normal, no murmurs or rub  Peripheral pulses are palpable.   Abdomen: No masses or tenderness.    Back: No vertebral tenderness or kyphosis.  Extremities: No evidence of leg edema.   Skin: Normal to touch.  No abnormal striae  Neurologic exam:  Visual fields are intact by confrontation, grossly intact. No evidence of peripheral neuropathy.  Detailed foot exam normal.        Diagnosis:  No diagnosis found.    Orders:   No orders of the " defined types were placed in this encounter.        Assessment and Plan: Pituitary microadenoma.    The patient has not been complaining of any headache.  He is currently on Dostinex 0.25 mg twice a week his serum prolactin is 7.8 visual fields are intact by confrontation.    Testosterone is 498.    He has been complaining of some fatigue and tiredness his TSH is 0.783 T4 0.9    I told him to consider sleep apnea and we also discussed diet and avoiding carbohydrates.    Vitamin D deficiency on 2000 units daily his vitamin D is adequate at 51.9    Hypertension he takes lisinopril 20 mg plus hydrochlorothiazide 12.5 mg daily blood pressure 122/88 potassium is 4.7    The patient also takes Effexor 150 mg daily    Patient return to clinic in 1 year I renewed his prescription for the Dostinex    I have reviewed and ordered clinical lab test    I have reviewed and ordered radiology tests.    I have reviewed and ordered her medication as required.    I have reviewed her test results and advised with the performing physician.    I have reviewed the patient's old records.    I have reviewed and summarized the patient old records.    I did spend 25 minutes with the patient more than 50% was spent on counseling and managing her care.

## 2021-06-17 NOTE — PROGRESS NOTES
"    Assessment & Plan     Regino was seen today for hypertension.    Diagnoses and all orders for this visit:    Essential hypertension with goal blood pressure less than 140/90      Continue current medications.           BMI:   Estimated body mass index is 39.14 kg/m  as calculated from the following:    Height as of this encounter: 5' 8.7\" (1.745 m).    Weight as of this encounter: 262 lb 12 oz (119.2 kg).       Return in about 6 months (around 10/30/2021) for BP.    Rajiv Figueroa MD  [facility]   Regino Bill is 60 y.o. and presents today for the following health issues   HPI     BP has been stable.    Asthma has been stable.      Current Outpatient Medications   Medication Sig Dispense Refill     cabergoline (DOSTINEX) 0.5 mg tablet Take 1/2 tablet twice weekly 8 tablet 11     cholecalciferol, vitamin D3, (VITAMIN D3) 2,000 unit cap Take 1 capsule by mouth daily.       EPINEPHrine (EPIPEN) 0.3 mg/0.3 mL atIn   0     fluticasone propionate (FLOVENT HFA) 110 mcg/actuation inhaler INHALE ONE PUFF BY MOUTH TWICE DAILY 1 Inhaler 5     hydroCHLOROthiazide (HYDRODIURIL) 12.5 MG tablet TAKE ONE TABLET BY MOUTH ONE TIME DAILY  90 tablet 3     ketotifen (ZADITOR) 0.025 % ophthalmic solution 1 drop as needed.       latanoprost (XALATAN) 0.005 % ophthalmic solution   10     lisinopriL (PRINIVIL,ZESTRIL) 40 MG tablet TAKE ONE TABLET BY MOUTH ONE TIME DAILY  90 tablet 2     loratadine (CLARITIN) 10 mg tablet Take 10 mg by mouth daily.       montelukast (SINGULAIR) 10 mg tablet TAKE ONE TABLET BY MOUTH ONE TIME DAILY  90 tablet 3     MULTIVITAMIN ORAL Take 1 tablet by mouth daily.       PROAIR HFA 90 mcg/actuation inhaler INHALE ONE OR TWO PUFFS BY MOUTH EVERY FOUR HOURS AS NEEDED  8.5 g 6     venlafaxine (EFFEXOR-XR) 150 MG 24 hr capsule TAKE ONE CAPSULE BY MOUTH ONE TIME DAILY  90 capsule 1     dorzolamide-timolol (COSOPT) 22.3-6.8 mg/mL ophthalmic solution   10     No current " "facility-administered medications for this visit.          Review of Systems  No fever or cough.      Objective    /80   Pulse 79   Temp 98.5  F (36.9  C) (Oral)   Ht 5' 8.7\" (1.745 m)   Wt (!) 262 lb 12 oz (119.2 kg)   SpO2 92%   BMI 39.14 kg/m    Body mass index is 39.14 kg/m .  Physical Exam  Heart normal  Lungs normal              "

## 2021-06-18 NOTE — PROGRESS NOTES
Assessment: /    Plan:    1. Essential hypertension with goal blood pressure less than 140/90  hydroCHLOROthiazide (HYDRODIURIL) 12.5 MG tablet   2. Mild persistent asthma without complication  montelukast (SINGULAIR) 10 mg tablet    albuterol (PROAIR HFA) 90 mcg/actuation inhaler   3. Chronic seasonal allergic rhinitis due to pollen  montelukast (SINGULAIR) 10 mg tablet       Recheck in 6 months.      Subjective:    HPI:  Breezy Bill is a 58-year-old male presenting for follow-up on hypertension.  He takes lisinopril and hydrochlorothiazide.    Asthma has not been causing any symptoms.  He has not needed Flovent for the past 2 years.  His sister previously had cats, and when she no longer had cats, his asthma symptoms decreased significantly.  He has allergies to tree pollen in the spring and ragweed in the fall.    Depression has been under control.  He is taking venlafaxine.      Social Hx: His sister  2 weeks ago.  She had CHF, coronary artery disease and diabetes.    Review of Systems: No fever or cough.      Current Outpatient Prescriptions   Medication Sig Dispense Refill     albuterol (PROAIR HFA) 90 mcg/actuation inhaler Inhale 1-2 puffs every 4 (four) hours as needed. 8.5 Inhaler 11     cabergoline (DOSTINEX) 0.5 mg tablet TAKE 1/2 TABLET BY MOUTH TWICE WEEKLY 12 tablet 1     cholecalciferol, vitamin D3, (VITAMIN D3) 2,000 unit cap Take 1 capsule by mouth daily.       dorzolamide-timolol (COSOPT) 22.3-6.8 mg/mL ophthalmic solution   10     EPINEPHrine (EPIPEN) 0.3 mg/0.3 mL atIn   0     hydroCHLOROthiazide (HYDRODIURIL) 12.5 MG tablet Take 1 tablet (12.5 mg total) by mouth daily. 90 tablet 3     ketotifen (ZADITOR) 0.025 % ophthalmic solution 1 drop as needed.       latanoprost (XALATAN) 0.005 % ophthalmic solution   10     lisinopril (PRINIVIL,ZESTRIL) 20 MG tablet TAKE 1 TABLET BY MOUTH DAILY. 90 tablet 3     loratadine (CLARITIN) 10 mg tablet Take 10 mg by mouth daily.       montelukast (SINGULAIR)  10 mg tablet Take 1 tablet (10 mg total) by mouth daily. 90 tablet 3     MULTIVITAMIN ORAL Take 1 tablet by mouth daily.       venlafaxine (EFFEXOR-XR) 150 MG 24 hr capsule TAKE 1 CAPSULE (150 MG TOTAL) BY MOUTH DAILY. 90 capsule 3     flunisolide (NASALIDE) 25 mcg (0.025 %) Spry USE 1 SPRAY IN EACH NOSTRIL TWICE DAILY. 25 mL 5     No current facility-administered medications for this visit.          Objective:    Vitals:    06/05/18 0803   BP: 136/84   Pulse: 66   Resp: 19   Temp: 98  F (36.7  C)   SpO2: 95%       Gen:  NAD, VSS  Lungs:  normal  Heart:  normal          ADDITIONAL HISTORY SUMMARIZED (2): None.  DECISION TO OBTAIN EXTRA INFORMATION (1): None.   RADIOLOGY TESTS (1): None.  LABS (1): None.  MEDICINE TESTS (1): None.  INDEPENDENT REVIEW (2 each): None.     Total Data Points: 0

## 2021-06-19 NOTE — LETTER
Letter by Rajiv Figueroa MD at      Author: Rajiv Figueroa MD Service: -- Author Type: --    Filed:  Encounter Date: 10/25/2019 Status: Signed         Regino Bill  [address] [facility] [id number]           Regino Bill  [address] [facility] [id number]      November 8, 2019      Dear Regino    In reviewing your records, we have determined a gap in your preventive services. Based on your age and health history, we recommend the follow service.     ? General Physical  ? Physical with a Pap Smear  ? Colon cancer screening  ? Mammogram  ? Immunization  ? Diabetic check  ? Blood pressure/cardiovascular check  ? Asthma check  ? Cholesterol test  ? Lab work  ? Med check      If you have had the service elsewhere, please contact us so we can update our records. Please let us know if you have transferred your care to another clinic.    Please call [phone] to schedule this appointment.    We believe that a strong preventive care program, including regular physicals and follow-up care is an important part of a healthy lifestyle and we are committed to helping you maintain your health.    Thank you for choosing us as your health care provider.    Sincerely,     [facility]

## 2021-06-20 NOTE — LETTER
Letter by Rajiv Figueroa MD at      Author: Rajiv Figueroa MD Service: -- Author Type: --    Filed:  Encounter Date: 2/23/2020 Status: (Other)       My Asthma Action Plan    Name: Regino Bill   YOB: 1960  Date: 2/23/2020   My doctor: Rajiv Figueroa MD   My clinic: [facility]         My Control Medicine: Fluticasone propionate (Flovent HFA) - 110 mcg    My Rescue Medicine: Albuterol (Proair/Ventolin/Proventil HFA) 2-4 puffs EVERY 4 HOURS as needed. Use a spacer if recommended by your provider.    My Asthma Severity:   Mild Persistent  Know your asthma triggers: smoke, upper respiratory infections and strong odors and fumes               GREEN ZONE   Good Control    I feel good    No cough or wheeze    Can work, sleep and play without asthma symptoms     Take your asthma control medicine every day.     1. If exercise triggers your asthma, take your rescue medication    15 minutes before exercise or sports, and    During exercise if you have asthma symptoms  2. Spacer to use with inhaler: If you have a spacer, make sure to use it with your inhaler             YELLOW ZONE Getting Worse  I have ANY of these:    I do not feel good    Cough or wheeze    Chest feels tight    Wake up at night 1. Keep taking your Green Zone medications  2. Start taking your rescue medicine:    every 20 minutes for up to 1 hour. Then every 4 hours for 24-48 hours.  3. If you stay in the Yellow Zone for more than 12-24 hours, contact your doctor.  4. If you do not return to the Green Zone in 12-24 hours or you get worse, start taking your oral steroid medicine if prescribed by your provider.           RED ZONE Medical Alert - Get Help  I have ANY of these:    I feel awful    Medicine is not helping    Breathing getting harder    Trouble walking or talking    Nose opens wide to breathe     1. Take your rescue medicine NOW  2. If your provider has prescribed an oral steroid medicine, start  taking it NOW  3. Call your doctor NOW  4. If you are still in the Red Zone after 20 minutes and you have not reached your doctor:    Take your rescue medicine again and    Call 911 or go to the emergency room right away    See your regular doctor within 2 weeks of an Emergency Room or Urgent Care visit for follow-up treatment.          Annual Reminders:  Meet with Asthma Educator,  Flu Shot in the Fall, consider Pneumonia Vaccination for patients with asthma (aged 19 and older).    Pharmacy:   [id number] [place] [place] [facility] [address]  [address] [facility] [address]  Phone: [phone] Fax: [phone]      Electronically signed by Rajiv Figueroa MD   Date: 02/23/20                    Asthma Triggers  How To Control Things That Make Your Asthma Worse    Triggers are things that make your asthma worse.  Look at the list below to help you find your triggers and what you can do about them.  You can help prevent asthma flare-ups by staying away from your triggers.      Trigger                                                          What you can do   Cigarette Smoke  Tobacco smoke can make asthma worse. Do not allow smoking in your home, car or around you.  Be sure no one smokes at a maikel day care or school.  If you smoke, ask your health care provider for ways to help you quit.  Ask family members to quit too.  Ask your health care provider for a referral to Quit Plan to help you quit smoking, or call 3-300-426-PLAN.     Colds, Flu, Bronchitis  These are common triggers of asthma. Wash your hands often.  Dont touch your eyes, nose or mouth.  Get a flu shot every year.     Dust Mites  These are tiny bugs that live in cloth or carpet. They are too small to see. Wash sheets and blankets in hot water every week.   Encase pillows and mattress in dust mite proof covers.  Avoid having carpet if you can. If you have carpet, vacuum weekly.   Use a dust mask and HEPA vacuum.   Pollen and Outdoor  Mold  Some people are allergic to trees, grass, or weed pollen, or molds. Try to keep your windows closed.  Limit time out doors when pollen count is high.   Ask you health care provider about taking medicine during allergy season.     Animal Dander  Some people are allergic to skin flakes, urine or saliva from pets with fur or feathers. Keep pets with fur or feathers out of your home.    If you cant keep the pet outdoors, then keep the pet out of your bedroom.  Keep the bedroom door closed.  Keep pets off cloth furniture and away from stuffed toys.     Mice, Rats, and Cockroaches   Some people are allergic to the waste from these pests.   Cover food and garbage.  Clean up spills and food crumbs.  Store grease in the refrigerator.   Keep food out of the bedroom.   Indoor Mold  This can be a trigger if your home has high moisture. Fix leaking faucets, pipes, or other sources of water.   Clean moldy surfaces.  Dehumidify basement if it is damp and smelly.   Smoke, Strong Odors, and Sprays  These can reduce air quality. Stay away from strong odors and sprays, such as perfume, powder, hair spray, paints, smoke incense, paint, cleaning products, candles and new carpet.   Exercise or Sports  Some people with asthma have this trigger. Be active!  Ask your doctor about taking medicine before sports or exercise to prevent symptoms.    Warm up for 5-10 minutes before and after sports or exercise.     Other Triggers of Asthma  Cold air:  Cover your nose and mouth with a scarf.  Sometimes laughing or crying can be a trigger.  Some medicines and food can trigger asthma.

## 2021-06-20 NOTE — LETTER
Letter by Rajiv Figueroa MD at      Author: Rajiv Figueroa MD Service: -- Author Type: --    Filed:  Encounter Date: 10/8/2020 Status: (Other)         Regino Bill  [address] [facility] [address]           October 8, 2020         Kye Tijerina,    Below are the results from your visit:  Your labs are normal.    Resulted Orders   Basic Metabolic Panel   Result Value Ref Range    Sodium 138 136 - 145 mmol/L    Potassium 4.7 3.5 - 5.0 mmol/L    Chloride 100 98 - 107 mmol/L    CO2 30 22 - 31 mmol/L    Anion Gap, Calculation 8 5 - 18 mmol/L    Glucose 120 70 - 125 mg/dL    Calcium 8.9 8.5 - 10.5 mg/dL    BUN 12 8 - 22 mg/dL    Creatinine 0.70 0.70 - 1.30 mg/dL    GFR MDRD Af Amer >60 >60 mL/min/1.73m2    GFR MDRD Non Af Amer >60 >60 mL/min/1.73m2    Narrative    Fasting Glucose reference range is 70-99 mg/dL per  American Diabetes Association (ADA) guidelines.   Prolactin   Result Value Ref Range    Prolactin 10.8 0.0 - 15.0 ng/mL       Please call with questions or contact us using [employer].    Sincerely,        Electronically signed by Rajiv Figueroa MD

## 2021-06-20 NOTE — LETTER
Letter by Rajiv Figueroa MD at      Author: Rajiv Figueroa MD Service: -- Author Type: --    Filed:  Encounter Date: 2/23/2020 Status: (Other)                    My Depression Action Plan  Name: Regino Bill   Date of Birth 1960  Date: 2/23/2020    My Doctor: Rajiv Figueroa MD   My Clinic: [facility]/[address] [address]  [phone]          GREEN    ZONE   Good Control    What it looks like:     Things are going generally well. You have normal ups and downs. You may even feel depressed from time to time, but bad moods usually last less than a day.   What you need to do:  1. Continue to care for yourself (see self care plan)  2. Check your depression survival kit and update it as needed  3. Follow your physicians recommendations including any medication.  4. Do not stop taking medication unless you consult with your physician first.           YELLOW         ZONE Getting Worse    What it looks like:     Depression is starting to interfere with your life.     It may be hard to get out of bed; you may be starting to isolate yourself from others.    Symptoms of depression are starting to last most all day and this has happened for several days.     You may have suicidal thoughts but they are not constant.   What you need to do:     1. Call your care team. Your response to treatment will improve if you keep your care team informed of your progress. Yellow periods are signs an adjustment may need to be made.     2. Continue your self-care.  Just get dressed and ready for the day.  Don't give yourself time to talk yourself out of it.    3. Talk to someone in your support network.    4. Open up your depression Depression Self-Care Plan / Wellness kit.           RED    ZONE Medical Alert - Get Help    What it looks like:     Depression is seriously interfering with your life.     You may experience these or other symptoms: You cant get out of bed most days,  cant work or engage in other necessary activities, you have trouble taking care of basic hygiene, or basic responsibilities, thoughts of suicide or death that will not go away, self-injurious behavior.     What you need to do:  1. Call your care team and request a same-day appointment. If they are not available (weekends or after hours) call your local crisis line, emergency room or 911.            Self-Care Plan / Wellness Kit    Self-Care for Depression  Heres the deal. Your body and mind are really not as separate as most people think.  What you do and think affects how you feel and how you feel influences what you do and think. This means if you do things that people who feel good do, it will help you feel better.  Sometimes this is all it takes.  There is also a place for medication and therapy depending on how severe your depression is, so be sure to consult with your medical provider and/ or Behavioral Health Consultant if your symptoms are worsening or not improving.     In order to better manage my stress, I will:    Exercise  Get some form of exercise, every day. This will help reduce pain and release endorphins, the feel good chemicals in your brain. This is almost as good as taking antidepressants!  This is not the same as joining a gym and then never going! (they count on that by the way?) It can be as simple as just going for a walk or doing some gardening, anything that will get you moving.      Hygiene   Maintain good hygiene (get out of bed in the morning, make your bed, brush your teeth, take a shower, and get dressed like you were going to work, even if you are unemployed).  If your clothes don't fit try to get ones that do.    Diet  Strive to eat foods that are good for me, drink plenty of water, and avoid excessive sugar, caffeine, alcohol, and other mood-altering substances.  Some foods that are helpful in depression are: complex carbohydrates, B vitamins, flaxseed, fish or fish oil, fresh  fruits and vegetables.    Psychotherapy  Agree to participate in Individual Therapy (if recommended).    Medication  If prescribed medications, I agree to take them.  Missing doses can result in serious side effects.  I understand that drinking alcohol, or other illicit drug use, may cause potential side effects.  I will not stop my medication abruptly without first discussing it with my provider.    Staying Connected With Others  Stay in touch with my friends, family members, and my primary care provider/team.    Use your imagination  Be creative.  We all have a creative side; it doesnt matter if its oil painting, sand castles, or mud pies! This will also kick up the endorphins.    Witness Beauty  (AKA stop and smell the roses) Take a look outside, even in mid-winter. Notice colors, textures. Watch the squirrels and birds.     Service to others  Be of service to others.  There is always someone else in need.  By helping others we can get out of ourselves and remember the really important things.  This also provides opportunities for practicing all the other parts of the program.    Humor  Laugh and be silly!  Adjust your TV habits for less news and crime-drama and more comedy.    Control your stress  Try breathing deep, massage therapy, biofeedback, and meditation. Find time to relax each day.     Crisis Text Line  http://www.crisistextline.org    The Crisis Text Line serves anyone, in any type of crisis, providing access to free, 24/7 support and information via the medium people already use and trust:    Here's how it works:  1.  Text 848-479 from anywhere in the [place], anytime, about any type of crisis.  2.  A live, trained Crisis Counselor receives the text and responds quickly.  3.  The volunteer Crisis Counselor will help you move from a 'hot moment to a cool moment'.  My support system    Clinic Contact:  Phone number:    Contact 1:  Phone number:    Contact 2:  Phone number:    Taoism/:   Phone number:    Therapist:  Phone number:    [facility] crisis center:    Phone number:    Other community support:  Phone number:

## 2021-06-20 NOTE — LETTER
Letter by Rajiv Figueroa MD at      Author: Rajiv Figueroa MD Service: -- Author Type: --    Filed:  Encounter Date: 10/25/2019 Status: Signed         Regino Bill  [address] [facility] [address]             Regino Bill  [address] [facility] [address] [facility] [address]      January 6, 2020      Dear Regino    In reviewing your records, we have determined a gap in your preventive services. Based on your age and health history, we recommend the follow service.     ? General Physical  ? Physical with a Pap Smear  ? Colon cancer screening  ? Mammogram  ? Immunization  ? Diabetic check  ? Blood pressure/cardiovascular check  ? Asthma check  ? Cholesterol test  ? Lab work  ? Med check      If you have had the service elsewhere, please contact us so we can update our records. Please let us know if you have transferred your care to another clinic.    Please call [phone] to schedule this appointment.    We believe that a strong preventive care program, including regular physicals and follow-up care is an important part of a healthy lifestyle and we are committed to helping you maintain your health.    Sincerely,     [facility]

## 2021-06-22 NOTE — PROGRESS NOTES
Assessment: /    Plan:    1. Essential hypertension with goal blood pressure less than 140/90     2. Need for vaccination  Influenza, Seasonal,Quad Inj, 36+ MOS    Td, Preservative Free (green label)   3. Screen for colon cancer  Cologuard       He agrees to do Cologuard.  Recheck in 6 months.      Subjective:    HPI:  Breezy Bill is a 58-year-old male presenting for follow-up on hypertension.  He takes lisinopril and HCTZ.    He has not had asthma symptoms, and no longer uses the steroid inhaler.      Review of Systems:  No fever or cough.      Current Outpatient Medications   Medication Sig Dispense Refill     albuterol (PROAIR HFA) 90 mcg/actuation inhaler Inhale 1-2 puffs every 4 (four) hours as needed. 8.5 Inhaler 11     cabergoline (DOSTINEX) 0.5 mg tablet TAKE 1/2 TABLET BY MOUTH TWICE WEEKLY 12 tablet 1     cholecalciferol, vitamin D3, (VITAMIN D3) 2,000 unit cap Take 1 capsule by mouth daily.       dorzolamide-timolol (COSOPT) 22.3-6.8 mg/mL ophthalmic solution   10     EPINEPHrine (EPIPEN) 0.3 mg/0.3 mL atIn   0     hydroCHLOROthiazide (HYDRODIURIL) 12.5 MG tablet Take 1 tablet (12.5 mg total) by mouth daily. 90 tablet 3     ketotifen (ZADITOR) 0.025 % ophthalmic solution 1 drop as needed.       latanoprost (XALATAN) 0.005 % ophthalmic solution   10     lisinopril (PRINIVIL,ZESTRIL) 20 MG tablet TAKE 1 TABLET BY MOUTH DAILY. 90 tablet 3     loratadine (CLARITIN) 10 mg tablet Take 10 mg by mouth daily.       montelukast (SINGULAIR) 10 mg tablet Take 1 tablet (10 mg total) by mouth daily. 90 tablet 3     MULTIVITAMIN ORAL Take 1 tablet by mouth daily.       venlafaxine (EFFEXOR-XR) 150 MG 24 hr capsule TAKE 1 CAPSULE (150 MG TOTAL) BY MOUTH DAILY. 90 capsule 3     flunisolide (NASALIDE) 25 mcg (0.025 %) Spry USE 1 SPRAY IN EACH NOSTRIL TWICE DAILY. 25 mL 5     No current facility-administered medications for this visit.          Objective:    Vitals:    12/05/18 0921 12/05/18 0926 12/05/18 0954   BP: 144/80  "140/78 136/78   Patient Site: Left Arm Left Arm    Patient Position: Sitting Sitting    Cuff Size: Adult Large Adult Large    Pulse: 66     Resp: 20     Temp: 98.5  F (36.9  C)     TempSrc: Oral     SpO2: 95%     Weight: (!) 254 lb 4 oz (115.3 kg)     Height: 5' 8.7\" (1.745 m)         Gen:  NAD, VSS  Lungs:  normal  Heart:  normal        ADDITIONAL HISTORY SUMMARIZED (2): None.  DECISION TO OBTAIN EXTRA INFORMATION (1): None.   RADIOLOGY TESTS (1): None.  LABS (1): None.  MEDICINE TESTS (1): None.  INDEPENDENT REVIEW (2 each): None.     Total Data Points: 0    "

## 2021-06-23 NOTE — TELEPHONE ENCOUNTER
Refill Approved    Rx renewed per Medication Renewal Policy. Medication was last renewed on 3/20/18 .    Anju De Dios, [facility] Triage/Med Refill 1/19/2019     Requested Prescriptions   Pending Prescriptions Disp Refills     lisinopril (PRINIVIL,ZESTRIL) 20 MG tablet [Pharmacy Med Name: Lisinopril Oral Tablet 20 MG] 90 tablet 1     Sig: TAKE ONE TABLET BY MOUTH ONE TIME DAILY    Ace Inhibitors Refill Protocol Passed - 1/19/2019 10:44 AM       Passed - PCP or prescribing provider visit in past 12 months      Last office visit with prescriber/PCP: 12/5/2018 Rajiv Figueroa MD OR same dept: 12/5/2018 Rajiv Figueroa MD OR same specialty: 12/5/2018 Rajiv Figueroa MD  Last physical: 1/3/2018 Last MTM visit: Visit date not found   Next visit within 3 mo: Visit date not found  Next physical within 3 mo: Visit date not found  Prescriber OR PCP: Rajiv Figueroa MD  Last diagnosis associated with med order: 1. Essential hypertension with goal blood pressure less than 140/90  - lisinopril (PRINIVIL,ZESTRIL) 20 MG tablet [Pharmacy Med Name: Lisinopril Oral Tablet 20 MG]; TAKE ONE TABLET BY MOUTH ONE TIME DAILY   Dispense: 90 tablet; Refill: 1    If protocol passes may refill for 12 months if within 3 months of last provider visit (or a total of 15 months).            Passed - Serum Potassium in past 12 months    Lab Results   Component Value Date    Potassium 4.7 04/16/2018            Passed - Blood pressure filed in past 12 months    BP Readings from Last 1 Encounters:   12/05/18 136/78            Passed - Serum Creatinine in past 12 months    Creatinine   Date Value Ref Range Status   04/16/2018 0.67 (L) 0.70 - 1.30 mg/dL Final

## 2021-06-24 NOTE — TELEPHONE ENCOUNTER
RN cannot approve Refill Request    RN can NOT refill this medication PCP messaged that patient is overdue for Labs.     Rianna Hernandez, [facility] Triage/Med Refill 3/4/2019    Requested Prescriptions   Pending Prescriptions Disp Refills     venlafaxine (EFFEXOR-XR) 150 MG 24 hr capsule [Pharmacy Med Name: Venlafaxine HCl ER Oral Capsule Extended Release 24 Hour 150 MG] 90 capsule 2     Sig: TAKE ONE CAPSULE BY MOUTH ONE TIME DAILY    Venlafaxine/Desvenlafaxine Refill Protocol Failed - 3/2/2019 11:23 AM       Failed - LFT or AST or ALT in last year    Albumin   Date Value Ref Range Status   10/13/2016 4.3 3.5 - 5.0 g/dL Final     Bilirubin, Total   Date Value Ref Range Status   10/13/2016 0.5 0.0 - 1.0 mg/dL Final     Bilirubin, Direct   Date Value Ref Range Status   10/13/2016 0.2 <=0.5 mg/dL Final     Alkaline Phosphatase   Date Value Ref Range Status   10/13/2016 89 45 - 120 U/L Final     AST   Date Value Ref Range Status   10/13/2016 23 0 - 40 U/L Final     ALT   Date Value Ref Range Status   10/13/2016 28 0 - 45 U/L Final     Protein, Total   Date Value Ref Range Status   10/13/2016 7.7 6.0 - 8.0 g/dL Final               Failed - Fasting lipid cascade in last year    Cholesterol   Date Value Ref Range Status   04/22/2016 195 <=199 mg/dL Final     Triglycerides   Date Value Ref Range Status   04/22/2016 67 <=149 mg/dL Final     HDL Cholesterol   Date Value Ref Range Status   04/22/2016 55 >=40 mg/dL Final     LDL Calculated   Date Value Ref Range Status   04/22/2016 127 <=129 mg/dL Final     Patient Fasting > 8hrs?   Date Value Ref Range Status   04/22/2016 Yes  Final            Passed - PCP or prescribing provider visit in last year    Last office visit with prescriber/PCP: 12/5/2018 Rajiv Figueroa MD OR same dept: 12/5/2018 Rajiv Figueroa MD OR same specialty: 12/5/2018 Rajiv Figueroa MD  Last physical: 1/3/2018 Last MTM visit: Visit date not found   Next visit within 3 mo: Visit date  not found  Next physical within 3 mo: Visit date not found  Prescriber OR PCP: Rajiv Figueroa MD  Last diagnosis associated with med order: 1. Vascular dementia with depressed mood  - venlafaxine (EFFEXOR-XR) 150 MG 24 hr capsule [Pharmacy Med Name: Venlafaxine HCl ER Oral Capsule Extended Release 24 Hour 150 MG]; TAKE ONE CAPSULE BY MOUTH ONE TIME DAILY   Dispense: 90 capsule; Refill: 2    If protocol passes may refill for 12 months if within 3 months of last provider visit (or a total of 15 months).            Passed - Blood Pressure in last year    BP Readings from Last 1 Encounters:   12/05/18 136/78

## 2021-06-25 NOTE — TELEPHONE ENCOUNTER
RN cannot approve Refill Request    RN can NOT refill this medication Protocol failed and NO refill given. Last office visit: 4/30/2021 Rajiv Figueroa MD Last Physical: 1/3/2018 Last MTM visit: Visit date not found Last visit same specialty: 4/30/2021 Rajiv Figueroa MD.  Next visit within 3 mo: Visit date not found  Next physical within 3 mo: Visit date not found      Virgie Arenas, [facility] Triage/Med Refill 5/28/2021    Requested Prescriptions   Pending Prescriptions Disp Refills     montelukast (SINGULAIR) 10 mg tablet [Pharmacy Med Name: Montelukast Sodium Oral Tablet 10 MG] 90 tablet 0     Sig: TAKE ONE TABLET BY MOUTH ONE TIME DAILY       There is no refill protocol information for this order        hydroCHLOROthiazide (HYDRODIURIL) 12.5 MG tablet [Pharmacy Med Name: hydroCHLOROthiazide Oral Tablet 12.5 MG] 90 tablet 0     Sig: TAKE ONE TABLET BY MOUTH ONE TIME DAILY       Diuretics/Combination Diuretics Refill Protocol  Passed - 5/28/2021  6:21 PM        Passed - Visit with PCP or prescribing provider visit in past 12 months     Last office visit with prescriber/PCP: 4/30/2021 Rajiv Figueroa MD OR same dept: 4/30/2021 Rajiv Figueroa MD OR same specialty: 4/30/2021 Rajiv Figueroa MD  Last physical: 1/3/2018 Last MTM visit: Visit date not found   Next visit within 3 mo: Visit date not found  Next physical within 3 mo: Visit date not found  Prescriber OR PCP: Rajiv Figueroa MD  Last diagnosis associated with med order: 1. Mild persistent asthma without complication  - montelukast (SINGULAIR) 10 mg tablet [Pharmacy Med Name: Montelukast Sodium Oral Tablet 10 MG]; TAKE ONE TABLET BY MOUTH ONE TIME DAILY   Dispense: 90 tablet; Refill: 0    2. Chronic seasonal allergic rhinitis due to pollen  - montelukast (SINGULAIR) 10 mg tablet [Pharmacy Med Name: Montelukast Sodium Oral Tablet 10 MG]; TAKE ONE TABLET BY MOUTH ONE TIME DAILY   Dispense: 90 tablet; Refill: 0    3.  Essential hypertension with goal blood pressure less than 140/90  - hydroCHLOROthiazide (HYDRODIURIL) 12.5 MG tablet [Pharmacy Med Name: hydroCHLOROthiazide Oral Tablet 12.5 MG]; TAKE ONE TABLET BY MOUTH ONE TIME DAILY   Dispense: 90 tablet; Refill: 0    If protocol passes may refill for 12 months if within 3 months of last provider visit (or a total of 15 months).             Passed - Serum Potassium in past 12 months      Lab Results   Component Value Date    Potassium 4.7 08/26/2020             Passed - Serum Sodium in past 12 months      Lab Results   Component Value Date    Sodium 138 08/26/2020             Passed - Blood pressure on file in past 12 months     BP Readings from Last 1 Encounters:   04/30/21 136/80             Passed - Serum Creatinine in past 12 months      Creatinine   Date Value Ref Range Status   08/26/2020 0.70 0.70 - 1.30 mg/dL Final

## 2021-06-27 ENCOUNTER — HEALTH MAINTENANCE LETTER (OUTPATIENT)
Age: 61
End: 2021-06-27

## 2021-10-16 ENCOUNTER — HEALTH MAINTENANCE LETTER (OUTPATIENT)
Age: 61
End: 2021-10-16

## 2021-11-02 ENCOUNTER — OFFICE VISIT (OUTPATIENT)
Dept: FAMILY MEDICINE | Facility: CLINIC | Age: 61
End: 2021-11-02
Payer: COMMERCIAL

## 2021-11-02 VITALS
DIASTOLIC BLOOD PRESSURE: 82 MMHG | HEART RATE: 76 BPM | WEIGHT: 257.25 LBS | SYSTOLIC BLOOD PRESSURE: 136 MMHG | HEIGHT: 68 IN | OXYGEN SATURATION: 97 % | BODY MASS INDEX: 38.99 KG/M2 | RESPIRATION RATE: 17 BRPM | TEMPERATURE: 98.5 F

## 2021-11-02 DIAGNOSIS — E66.01 MORBID OBESITY (H): ICD-10-CM

## 2021-11-02 DIAGNOSIS — I10 ESSENTIAL HYPERTENSION WITH GOAL BLOOD PRESSURE LESS THAN 140/90: Primary | ICD-10-CM

## 2021-11-02 DIAGNOSIS — E78.2 MIXED HYPERLIPIDEMIA: ICD-10-CM

## 2021-11-02 DIAGNOSIS — D35.2 PITUITARY ADENOMA (H): ICD-10-CM

## 2021-11-02 DIAGNOSIS — Z23 NEED FOR VACCINATION: ICD-10-CM

## 2021-11-02 DIAGNOSIS — E22.1 HYPERPROLACTINEMIA (H): ICD-10-CM

## 2021-11-02 DIAGNOSIS — F33.1 MAJOR DEPRESSIVE DISORDER, RECURRENT EPISODE, MODERATE (H): ICD-10-CM

## 2021-11-02 LAB
ALBUMIN SERPL-MCNC: 4 G/DL (ref 3.5–5)
ALP SERPL-CCNC: 95 U/L (ref 45–120)
ALT SERPL W P-5'-P-CCNC: 22 U/L (ref 0–45)
ANION GAP SERPL CALCULATED.3IONS-SCNC: 10 MMOL/L (ref 5–18)
AST SERPL W P-5'-P-CCNC: 15 U/L (ref 0–40)
BILIRUB SERPL-MCNC: 0.5 MG/DL (ref 0–1)
BUN SERPL-MCNC: 12 MG/DL (ref 8–22)
CALCIUM SERPL-MCNC: 9.4 MG/DL (ref 8.5–10.5)
CHLORIDE BLD-SCNC: 99 MMOL/L (ref 98–107)
CHOLEST SERPL-MCNC: 198 MG/DL
CO2 SERPL-SCNC: 30 MMOL/L (ref 22–31)
CREAT SERPL-MCNC: 0.71 MG/DL (ref 0.7–1.3)
FASTING STATUS PATIENT QL REPORTED: YES
GFR SERPL CREATININE-BSD FRML MDRD: >90 ML/MIN/1.73M2
GLUCOSE BLD-MCNC: 153 MG/DL (ref 70–125)
HDLC SERPL-MCNC: 57 MG/DL
LDLC SERPL CALC-MCNC: 120 MG/DL
POTASSIUM BLD-SCNC: 4.6 MMOL/L (ref 3.5–5)
PROT SERPL-MCNC: 7.6 G/DL (ref 6–8)
PSA SERPL-MCNC: 0.29 UG/L (ref 0–3.5)
SODIUM SERPL-SCNC: 139 MMOL/L (ref 136–145)
TRIGL SERPL-MCNC: 107 MG/DL

## 2021-11-02 PROCEDURE — 99214 OFFICE O/P EST MOD 30 MIN: CPT | Mod: 25 | Performed by: FAMILY MEDICINE

## 2021-11-02 PROCEDURE — 84153 ASSAY OF PSA TOTAL: CPT | Performed by: FAMILY MEDICINE

## 2021-11-02 PROCEDURE — 91300 PR COVID VAC PFIZER DIL RECON 30 MCG/0.3 ML IM: CPT | Performed by: FAMILY MEDICINE

## 2021-11-02 PROCEDURE — 80053 COMPREHEN METABOLIC PANEL: CPT | Performed by: FAMILY MEDICINE

## 2021-11-02 PROCEDURE — 90682 RIV4 VACC RECOMBINANT DNA IM: CPT | Performed by: FAMILY MEDICINE

## 2021-11-02 PROCEDURE — 80061 LIPID PANEL: CPT | Performed by: FAMILY MEDICINE

## 2021-11-02 PROCEDURE — 36415 COLL VENOUS BLD VENIPUNCTURE: CPT | Performed by: FAMILY MEDICINE

## 2021-11-02 PROCEDURE — 90471 IMMUNIZATION ADMIN: CPT | Performed by: FAMILY MEDICINE

## 2021-11-02 PROCEDURE — 0003A PR COVID VAC PFIZER DIL RECON 30 MCG/0.3 ML IM: CPT | Performed by: FAMILY MEDICINE

## 2021-11-02 ASSESSMENT — PATIENT HEALTH QUESTIONNAIRE - PHQ9: SUM OF ALL RESPONSES TO PHQ QUESTIONS 1-9: 3

## 2021-11-02 ASSESSMENT — ASTHMA QUESTIONNAIRES
QUESTION_4 LAST FOUR WEEKS HOW OFTEN HAVE YOU USED YOUR RESCUE INHALER OR NEBULIZER MEDICATION (SUCH AS ALBUTEROL): ONCE A WEEK OR LESS
QUESTION_1 LAST FOUR WEEKS HOW MUCH OF THE TIME DID YOUR ASTHMA KEEP YOU FROM GETTING AS MUCH DONE AT WORK, SCHOOL OR AT HOME: NONE OF THE TIME
QUESTION_2 LAST FOUR WEEKS HOW OFTEN HAVE YOU HAD SHORTNESS OF BREATH: ONCE OR TWICE A WEEK
QUESTION_3 LAST FOUR WEEKS HOW OFTEN DID YOUR ASTHMA SYMPTOMS (WHEEZING, COUGHING, SHORTNESS OF BREATH, CHEST TIGHTNESS OR PAIN) WAKE YOU UP AT NIGHT OR EARLIER THAN USUAL IN THE MORNING: NOT AT ALL
ACT_TOTALSCORE: 23
QUESTION_5 LAST FOUR WEEKS HOW WOULD YOU RATE YOUR ASTHMA CONTROL: COMPLETELY CONTROLLED

## 2021-11-02 ASSESSMENT — MIFFLIN-ST. JEOR: SCORE: 1956.38

## 2021-11-03 ASSESSMENT — ASTHMA QUESTIONNAIRES: ACT_TOTALSCORE: 23

## 2021-11-07 NOTE — PROGRESS NOTES
"  Assessment & Plan     Essential hypertension with goal blood pressure less than 140/90    - Comprehensive metabolic panel (BMP + Alb, Alk Phos, ALT, AST, Total. Bili, TP)  - Comprehensive metabolic panel (BMP + Alb, Alk Phos, ALT, AST, Total. Bili, TP)    Stable, continue meds.      Hyperprolactinemia (H)    Stable, continue cabergoline.      Major depressive disorder, recurrent episode, moderate (H)    Stable.      Need for vaccination    - INFLUENZA QUAD, PF (RIV4) (FLUBLOK)    Morbid obesity (H)    Weight is decreasing.  Continue current plan.      Pituitary adenoma (H)    - PSA, tumor marker  - PSA, tumor marker    Mixed hyperlipidemia    - Lipid panel reflex to direct LDL Fasting  - Lipid panel reflex to direct LDL Fasting    Stable.               BMI:   Estimated body mass index is 39.11 kg/m  as calculated from the following:    Height as of this encounter: 1.727 m (5' 8\").    Weight as of this encounter: 116.7 kg (257 lb 4 oz).   Weight management plan: Discussed healthy diet and exercise guidelines    Work on weight loss  Regular exercise    Return in about 6 months (around 5/2/2022) for BP.    Rajiv Figueroa MD, MD  [facility] ALAN Tijerina is a 61 year old who presents for the following health issues     HPI     Has been eating less fast food.  Weight decreased 5 # since last visit.    Taking cabergoline for hyperprolactinemia.    Current Outpatient Medications   Medication Sig Dispense Refill     cabergoline (DOSTINEX) 0.5 MG tablet TAKE HALF TABLET BY MOUTH TWICE WEEKLY 8 tablet 11     cholecalciferol, vitamin D3, (VITAMIN D3) 2,000 unit cap [CHOLECALCIFEROL, VITAMIN D3, (VITAMIN D3) 2,000 UNIT CAP] Take 1 capsule by mouth daily.       dorzolamide-timolol (COSOPT) 22.3-6.8 mg/mL ophthalmic solution [DORZOLAMIDE-TIMOLOL (COSOPT) 22.3-6.8 MG/ML OPHTHALMIC SOLUTION]   10     EPINEPHrine (EPIPEN) 0.3 mg/0.3 mL atIn [EPINEPHRINE (EPIPEN) 0.3 MG/0.3 ML ATIN]   0     " "fluticasone propionate (FLOVENT HFA) 110 mcg/actuation inhaler [FLUTICASONE PROPIONATE (FLOVENT HFA) 110 MCG/ACTUATION INHALER] INHALE ONE PUFF BY MOUTH TWICE DAILY 1 Inhaler 5     hydroCHLOROthiazide (HYDRODIURIL) 12.5 MG tablet [HYDROCHLOROTHIAZIDE (HYDRODIURIL) 12.5 MG TABLET] TAKE ONE TABLET BY MOUTH ONE TIME DAILY  90 tablet 3     latanoprost (XALATAN) 0.005 % ophthalmic solution [LATANOPROST (XALATAN) 0.005 % OPHTHALMIC SOLUTION]   10     lisinopril (ZESTRIL) 40 MG tablet TAKE ONE TABLET BY MOUTH ONE TIME DAILY  90 tablet 3     loratadine (CLARITIN) 10 mg tablet [LORATADINE (CLARITIN) 10 MG TABLET] Take 10 mg by mouth daily.       montelukast (SINGULAIR) 10 mg tablet [MONTELUKAST (SINGULAIR) 10 MG TABLET] TAKE ONE TABLET BY MOUTH ONE TIME DAILY  90 tablet 3     MULTIVITAMIN ORAL [MULTIVITAMIN ORAL] Take 1 tablet by mouth daily.       PROAIR HFA 90 mcg/actuation inhaler [PROAIR HFA 90 MCG/ACTUATION INHALER] INHALE ONE OR TWO PUFFS BY MOUTH EVERY FOUR HOURS AS NEEDED  8.5 g 6     venlafaxine (EFFEXOR-XR) 150 MG 24 hr capsule [VENLAFAXINE (EFFEXOR-XR) 150 MG 24 HR CAPSULE] TAKE ONE CAPSULE BY MOUTH ONE TIME DAILY  90 capsule 3     ketotifen (ZADITOR) 0.025 % ophthalmic solution [KETOTIFEN (ZADITOR) 0.025 % OPHTHALMIC SOLUTION] 1 drop as needed. (Patient not taking: Reported on 11/2/2021)           Review of Systems   No fever or cough.      Objective    /82   Pulse 76   Temp 98.5  F (36.9  C) (Oral)   Resp 17   Ht 1.727 m (5' 8\")   Wt 116.7 kg (257 lb 4 oz)   SpO2 97%   BMI 39.11 kg/m    Body mass index is 39.11 kg/m .  Physical Exam   Heart normal  Lungs normal  Left parietal scalp sebaceous cyst            "

## 2022-04-12 DIAGNOSIS — F01.53 VASCULAR DEMENTIA WITH DEPRESSED MOOD (H): ICD-10-CM

## 2022-04-12 DIAGNOSIS — Z76.0 ENCOUNTER FOR MEDICATION REFILL: Primary | ICD-10-CM

## 2022-04-14 RX ORDER — VENLAFAXINE HYDROCHLORIDE 150 MG/1
CAPSULE, EXTENDED RELEASE ORAL
Qty: 90 CAPSULE | Refills: 3 | Status: SHIPPED | OUTPATIENT
Start: 2022-04-14 | End: 2023-04-02

## 2022-05-03 ENCOUNTER — OFFICE VISIT (OUTPATIENT)
Dept: FAMILY MEDICINE | Facility: CLINIC | Age: 62
End: 2022-05-03
Payer: COMMERCIAL

## 2022-05-03 VITALS
HEART RATE: 75 BPM | WEIGHT: 262.5 LBS | OXYGEN SATURATION: 96 % | TEMPERATURE: 98.3 F | SYSTOLIC BLOOD PRESSURE: 162 MMHG | DIASTOLIC BLOOD PRESSURE: 86 MMHG | BODY MASS INDEX: 39.91 KG/M2

## 2022-05-03 DIAGNOSIS — Z11.59 NEED FOR HEPATITIS C SCREENING TEST: ICD-10-CM

## 2022-05-03 DIAGNOSIS — Z12.11 SCREEN FOR COLON CANCER: ICD-10-CM

## 2022-05-03 DIAGNOSIS — Z23 NEED FOR VACCINATION: ICD-10-CM

## 2022-05-03 DIAGNOSIS — R73.09 ELEVATED GLUCOSE: ICD-10-CM

## 2022-05-03 DIAGNOSIS — I10 ESSENTIAL HYPERTENSION WITH GOAL BLOOD PRESSURE LESS THAN 140/90: Primary | ICD-10-CM

## 2022-05-03 DIAGNOSIS — Z11.4 SCREENING FOR HIV (HUMAN IMMUNODEFICIENCY VIRUS): ICD-10-CM

## 2022-05-03 DIAGNOSIS — Z23 IMMUNIZATION DUE: ICD-10-CM

## 2022-05-03 DIAGNOSIS — E22.1 HYPERPROLACTINEMIA (H): ICD-10-CM

## 2022-05-03 DIAGNOSIS — Z91.030 BEE STING ALLERGY: ICD-10-CM

## 2022-05-03 LAB
HBA1C MFR BLD: 7.7 % (ref 0–5.6)
HCV AB SERPL QL IA: NONREACTIVE
HIV 1+2 AB+HIV1 P24 AG SERPL QL IA: NEGATIVE
PROLACTIN SERPL-MCNC: 10.3 NG/ML (ref 0–15)

## 2022-05-03 PROCEDURE — 87389 HIV-1 AG W/HIV-1&-2 AB AG IA: CPT | Performed by: FAMILY MEDICINE

## 2022-05-03 PROCEDURE — 36415 COLL VENOUS BLD VENIPUNCTURE: CPT | Performed by: FAMILY MEDICINE

## 2022-05-03 PROCEDURE — 0054A COVID-19,PF,PFIZER (12+ YRS): CPT | Performed by: FAMILY MEDICINE

## 2022-05-03 PROCEDURE — 84146 ASSAY OF PROLACTIN: CPT | Performed by: FAMILY MEDICINE

## 2022-05-03 PROCEDURE — 91305 COVID-19,PF,PFIZER (12+ YRS): CPT | Performed by: FAMILY MEDICINE

## 2022-05-03 PROCEDURE — 99214 OFFICE O/P EST MOD 30 MIN: CPT | Mod: 25 | Performed by: FAMILY MEDICINE

## 2022-05-03 PROCEDURE — 83036 HEMOGLOBIN GLYCOSYLATED A1C: CPT | Performed by: FAMILY MEDICINE

## 2022-05-03 PROCEDURE — 86803 HEPATITIS C AB TEST: CPT | Performed by: FAMILY MEDICINE

## 2022-05-03 RX ORDER — AMLODIPINE BESYLATE 2.5 MG/1
2.5 TABLET ORAL DAILY
Qty: 30 TABLET | Refills: 3 | Status: SHIPPED | OUTPATIENT
Start: 2022-05-03 | End: 2022-07-05

## 2022-05-03 RX ORDER — EPINEPHRINE 0.3 MG/.3ML
INJECTION SUBCUTANEOUS
Refills: 0 | Status: CANCELLED | OUTPATIENT
Start: 2022-05-03

## 2022-05-03 RX ORDER — EPINEPHRINE 0.3 MG/.3ML
0.3 INJECTION SUBCUTANEOUS PRN
Qty: 1 EACH | Refills: 1 | Status: SHIPPED | OUTPATIENT
Start: 2022-05-03

## 2022-05-03 ASSESSMENT — PATIENT HEALTH QUESTIONNAIRE - PHQ9: SUM OF ALL RESPONSES TO PHQ QUESTIONS 1-9: 2

## 2022-05-03 ASSESSMENT — ASTHMA QUESTIONNAIRES: ACT_TOTALSCORE: 23

## 2022-05-13 NOTE — PROGRESS NOTES
"  Assessment & Plan     Essential hypertension with goal blood pressure less than 140/90    - amLODIPine (NORVASC) 2.5 MG tablet  Dispense: 30 tablet; Refill: 3    Screen for colon cancer    - COLOGUARD([employer])    Screening for HIV (human immunodeficiency virus)    - HIV Antigen Antibody Combo  - HIV Antigen Antibody Combo    Need for hepatitis C screening test    - Hepatitis C Screen Reflex to HCV RNA Quant and Genotype  - Hepatitis C Screen Reflex to HCV RNA Quant and Genotype    Immunization due      Bee sting allergy    - EPINEPHrine (ANY BX GENERIC EQUIV) 0.3 MG/0.3ML injection 2-pack  Dispense: 1 each; Refill: 1    Hyperprolactinemia (H)    - Prolactin  - Prolactin    Elevated glucose    - Hemoglobin A1c  - Hemoglobin A1c    Need for vaccination    - COVID-19,PF,PFIZER (12+ Yrs GRAY LABEL)        36 minutes spent on the date of the encounter doing chart review, review of test results and patient visit regarding hypertension, hyperprolactinemia.       BMI:   Estimated body mass index is 39.91 kg/m  as calculated from the following:    Height as of 11/2/21: 1.727 m (5' 8\").    Weight as of this encounter: 119.1 kg (262 lb 8 oz).           Return in about 2 months (around 7/3/2022) for BP.    Rajiv Figueroa MD, MD  [facility] ALAN Tijerina is a 61 year old who presents for the following health issues     History of Present Illness       Hypertension: He presents for follow up of hypertension.  He does not check blood pressure  regularly outside of the clinic. Outside blood pressures have been over 140/90. He follows a low salt diet.     He eats 2-3 servings of fruits and vegetables daily.He consumes 1 sweetened beverage(s) daily.He exercises with enough effort to increase his heart rate 10 to 19 minutes per day.  He exercises with enough effort to increase his heart rate 5 days per week.   He is taking medications regularly.       Used rescue inhaler last week, none for " months prior.    Current Outpatient Medications   Medication Sig Dispense Refill     amLODIPine (NORVASC) 2.5 MG tablet Take 1 tablet (2.5 mg) by mouth daily 30 tablet 3     cabergoline (DOSTINEX) 0.5 MG tablet TAKE HALF TABLET BY MOUTH TWICE WEEKLY 8 tablet 11     cholecalciferol, vitamin D3, (VITAMIN D3) 2,000 unit cap [CHOLECALCIFEROL, VITAMIN D3, (VITAMIN D3) 2,000 UNIT CAP] Take 1 capsule by mouth daily.       dorzolamide-timolol (COSOPT) 22.3-6.8 mg/mL ophthalmic solution [DORZOLAMIDE-TIMOLOL (COSOPT) 22.3-6.8 MG/ML OPHTHALMIC SOLUTION]   10     EPINEPHrine (ANY BX GENERIC EQUIV) 0.3 MG/0.3ML injection 2-pack Inject 0.3 mLs (0.3 mg) into the muscle as needed for anaphylaxis 1 each 1     EPINEPHrine (EPIPEN) 0.3 mg/0.3 mL atIn [EPINEPHRINE (EPIPEN) 0.3 MG/0.3 ML ATIN]   0     fluticasone propionate (FLOVENT HFA) 110 mcg/actuation inhaler [FLUTICASONE PROPIONATE (FLOVENT HFA) 110 MCG/ACTUATION INHALER] INHALE ONE PUFF BY MOUTH TWICE DAILY 1 Inhaler 5     hydroCHLOROthiazide (HYDRODIURIL) 12.5 MG tablet [HYDROCHLOROTHIAZIDE (HYDRODIURIL) 12.5 MG TABLET] TAKE ONE TABLET BY MOUTH ONE TIME DAILY  90 tablet 3     latanoprost (XALATAN) 0.005 % ophthalmic solution [LATANOPROST (XALATAN) 0.005 % OPHTHALMIC SOLUTION]   10     lisinopril (ZESTRIL) 40 MG tablet TAKE ONE TABLET BY MOUTH ONE TIME DAILY  90 tablet 3     loratadine (CLARITIN) 10 mg tablet [LORATADINE (CLARITIN) 10 MG TABLET] Take 10 mg by mouth daily.       montelukast (SINGULAIR) 10 mg tablet [MONTELUKAST (SINGULAIR) 10 MG TABLET] TAKE ONE TABLET BY MOUTH ONE TIME DAILY  90 tablet 3     MULTIVITAMIN ORAL [MULTIVITAMIN ORAL] Take 1 tablet by mouth daily.       PROAIR HFA 90 mcg/actuation inhaler [PROAIR HFA 90 MCG/ACTUATION INHALER] INHALE ONE OR TWO PUFFS BY MOUTH EVERY FOUR HOURS AS NEEDED  8.5 g 6     venlafaxine (EFFEXOR-XR) 150 MG 24 hr capsule TAKE ONE CAPSULE BY MOUTH ONE TIME DAILY 90 capsule 3     ketotifen (ZADITOR) 0.025 % ophthalmic solution  [KETOTIFEN (ZADITOR) 0.025 % OPHTHALMIC SOLUTION] 1 drop as needed. (Patient not taking: No sig reported)           Review of Systems   No fever or cough.      Objective    BP (!) 162/86   Pulse 75   Temp 98.3  F (36.8  C) (Oral)   Wt 119.1 kg (262 lb 8 oz)   SpO2 96%   BMI 39.91 kg/m    Body mass index is 39.91 kg/m .  Physical Exam   Heart normal  Lungs normal

## 2022-07-05 ENCOUNTER — OFFICE VISIT (OUTPATIENT)
Dept: FAMILY MEDICINE | Facility: CLINIC | Age: 62
End: 2022-07-05
Payer: COMMERCIAL

## 2022-07-05 VITALS
OXYGEN SATURATION: 94 % | DIASTOLIC BLOOD PRESSURE: 80 MMHG | BODY MASS INDEX: 38.24 KG/M2 | TEMPERATURE: 98.2 F | SYSTOLIC BLOOD PRESSURE: 144 MMHG | HEART RATE: 71 BPM | WEIGHT: 251.5 LBS

## 2022-07-05 DIAGNOSIS — E66.01 MORBID OBESITY (H): ICD-10-CM

## 2022-07-05 DIAGNOSIS — E11.9 TYPE 2 DIABETES MELLITUS WITHOUT COMPLICATION, WITHOUT LONG-TERM CURRENT USE OF INSULIN (H): ICD-10-CM

## 2022-07-05 DIAGNOSIS — I10 ESSENTIAL HYPERTENSION WITH GOAL BLOOD PRESSURE LESS THAN 140/90: Primary | ICD-10-CM

## 2022-07-05 DIAGNOSIS — D35.2 PITUITARY ADENOMA (H): ICD-10-CM

## 2022-07-05 DIAGNOSIS — F33.1 MAJOR DEPRESSIVE DISORDER, RECURRENT EPISODE, MODERATE (H): ICD-10-CM

## 2022-07-05 PROCEDURE — 99214 OFFICE O/P EST MOD 30 MIN: CPT | Performed by: FAMILY MEDICINE

## 2022-07-05 RX ORDER — AMLODIPINE BESYLATE 5 MG/1
5 TABLET ORAL DAILY
Qty: 90 TABLET | Refills: 3 | Status: SHIPPED | OUTPATIENT
Start: 2022-07-05 | End: 2023-05-22

## 2022-07-05 NOTE — PROGRESS NOTES
"  Assessment & Plan     Essential hypertension with goal blood pressure less than 140/90    Not controlled.  Increase amlodipine to 5 mg.    - amLODIPine (NORVASC) 5 MG tablet  Dispense: 90 tablet; Refill: 3    Type 2 diabetes mellitus without complication, without long-term current use of insulin (H)    Anticipate improvement due to careful diet.    He prefers to wait with statin for now.      Major depressive disorder, recurrent episode, moderate (H)    Stable.  Continue venlafaxine.      Morbid obesity (H)    Improving.      Pituitary adenoma (H)    Stable.               BMI:   Estimated body mass index is 38.24 kg/m  as calculated from the following:    Height as of 11/2/21: 1.727 m (5' 8\").    Weight as of this encounter: 114.1 kg (251 lb 8 oz).           Return in about 2 months (around 9/5/2022) for DM.    Rajiv Figueroa MD, MD  [facility] ALAN Tijerina is a 62 year old, presenting for the following health issues:  No chief complaint on file.      History of Present Illness       Hypertension: He presents for follow up of hypertension.  He does not check blood pressure  regularly outside of the clinic. Outside blood pressures have been over 140/90. He follows a low salt diet.       BP has been about 149 systolic at home.  Has been taking amlodipine 2.5 mg, lisinopril and hydrochlorothiazide.    Recent diagnosis of diabetes.  He has been eating carefully, and lost 11 #.  He eats less bread.  He has more energy now.    Current Outpatient Medications   Medication Sig Dispense Refill     amLODIPine (NORVASC) 5 MG tablet Take 1 tablet (5 mg) by mouth daily 90 tablet 3     cabergoline (DOSTINEX) 0.5 MG tablet TAKE HALF TABLET BY MOUTH TWICE WEEKLY 8 tablet 11     cholecalciferol, vitamin D3, (VITAMIN D3) 2,000 unit cap [CHOLECALCIFEROL, VITAMIN D3, (VITAMIN D3) 2,000 UNIT CAP] Take 1 capsule by mouth daily.       dorzolamide-timolol (COSOPT) 22.3-6.8 mg/mL ophthalmic solution " [DORZOLAMIDE-TIMOLOL (COSOPT) 22.3-6.8 MG/ML OPHTHALMIC SOLUTION]   10     EPINEPHrine (ANY BX GENERIC EQUIV) 0.3 MG/0.3ML injection 2-pack Inject 0.3 mLs (0.3 mg) into the muscle as needed for anaphylaxis 1 each 1     EPINEPHrine (EPIPEN) 0.3 mg/0.3 mL atIn [EPINEPHRINE (EPIPEN) 0.3 MG/0.3 ML ATIN]   0     fluticasone propionate (FLOVENT HFA) 110 mcg/actuation inhaler [FLUTICASONE PROPIONATE (FLOVENT HFA) 110 MCG/ACTUATION INHALER] INHALE ONE PUFF BY MOUTH TWICE DAILY 1 Inhaler 5     hydroCHLOROthiazide (HYDRODIURIL) 12.5 MG tablet [HYDROCHLOROTHIAZIDE (HYDRODIURIL) 12.5 MG TABLET] TAKE ONE TABLET BY MOUTH ONE TIME DAILY  90 tablet 3     latanoprost (XALATAN) 0.005 % ophthalmic solution [LATANOPROST (XALATAN) 0.005 % OPHTHALMIC SOLUTION]   10     loratadine (CLARITIN) 10 mg tablet [LORATADINE (CLARITIN) 10 MG TABLET] Take 10 mg by mouth daily.       montelukast (SINGULAIR) 10 mg tablet [MONTELUKAST (SINGULAIR) 10 MG TABLET] TAKE ONE TABLET BY MOUTH ONE TIME DAILY  90 tablet 3     MULTIVITAMIN ORAL [MULTIVITAMIN ORAL] Take 1 tablet by mouth daily.       PROAIR HFA 90 mcg/actuation inhaler [PROAIR HFA 90 MCG/ACTUATION INHALER] INHALE ONE OR TWO PUFFS BY MOUTH EVERY FOUR HOURS AS NEEDED  8.5 g 6     venlafaxine (EFFEXOR-XR) 150 MG 24 hr capsule TAKE ONE CAPSULE BY MOUTH ONE TIME DAILY 90 capsule 3     ketotifen (ZADITOR) 0.025 % ophthalmic solution [KETOTIFEN (ZADITOR) 0.025 % OPHTHALMIC SOLUTION] 1 drop as needed. (Patient not taking: No sig reported)       lisinopril (ZESTRIL) 40 MG tablet TAKE ONE TABLET BY MOUTH ONE TIME DAILY 90 tablet 3           Review of Systems   No fever or cough.      Objective    There were no vitals taken for this visit.  There is no height or weight on file to calculate BMI.  Physical Exam   Heart normal  Lungs normal                .  ..

## 2022-07-23 ENCOUNTER — HEALTH MAINTENANCE LETTER (OUTPATIENT)
Age: 62
End: 2022-07-23

## 2022-07-31 DIAGNOSIS — J45.30 MILD PERSISTENT ASTHMA WITHOUT COMPLICATION: ICD-10-CM

## 2022-07-31 DIAGNOSIS — J30.1 CHRONIC SEASONAL ALLERGIC RHINITIS DUE TO POLLEN: ICD-10-CM

## 2022-08-01 RX ORDER — MONTELUKAST SODIUM 10 MG/1
TABLET ORAL
Qty: 90 TABLET | Refills: 3 | Status: SHIPPED | OUTPATIENT
Start: 2022-08-01 | End: 2023-07-29

## 2022-08-01 NOTE — TELEPHONE ENCOUNTER
"Last Written Prescription Date:  6/1/2021  Last Fill Quantity 90,  # refills: 3   Last office visit provider:  7/5/2022     Requested Prescriptions   Pending Prescriptions Disp Refills     montelukast (SINGULAIR) 10 MG tablet [Pharmacy Med Name: Montelukast Sodium Oral Tablet 10 MG] 90 tablet 0     Sig: TAKE ONE TABLET BY MOUTH ONE TIME DAILY       Leukotriene Inhibitors Protocol Passed - 7/31/2022 10:05 AM        Passed - Patient is age 12 or older     If patient is under 16, ok to refill using age based dosing.           Passed - Asthma control assessment score within normal limits in last 6 months     Please review ACT score.           Passed - Medication is active on med list        Passed - Recent (6 mo) or future (30 days) visit within the authorizing provider's specialty     Patient had office visit in the last 6 months or has a visit in the next 30 days with authorizing provider or within the authorizing provider's specialty.  See \"Patient Info\" tab in inbasket, or \"Choose Columns\" in Meds & Orders section of the refill encounter.                 Laura Torres RN 08/01/22 9:09 AM  "

## 2022-08-30 DIAGNOSIS — Z76.0 ENCOUNTER FOR MEDICATION REFILL: Primary | ICD-10-CM

## 2022-08-30 RX ORDER — DEXAMETHASONE 4 MG/1
TABLET ORAL
Qty: 12 G | Refills: 3 | Status: SHIPPED | OUTPATIENT
Start: 2022-08-30 | End: 2024-06-16

## 2022-09-05 ASSESSMENT — ASTHMA QUESTIONNAIRES
QUESTION_2 LAST FOUR WEEKS HOW OFTEN HAVE YOU HAD SHORTNESS OF BREATH: ONCE OR TWICE A WEEK
ACT_TOTALSCORE: 22
QUESTION_3 LAST FOUR WEEKS HOW OFTEN DID YOUR ASTHMA SYMPTOMS (WHEEZING, COUGHING, SHORTNESS OF BREATH, CHEST TIGHTNESS OR PAIN) WAKE YOU UP AT NIGHT OR EARLIER THAN USUAL IN THE MORNING: NOT AT ALL
QUESTION_4 LAST FOUR WEEKS HOW OFTEN HAVE YOU USED YOUR RESCUE INHALER OR NEBULIZER MEDICATION (SUCH AS ALBUTEROL): ONCE A WEEK OR LESS
QUESTION_1 LAST FOUR WEEKS HOW MUCH OF THE TIME DID YOUR ASTHMA KEEP YOU FROM GETTING AS MUCH DONE AT WORK, SCHOOL OR AT HOME: NONE OF THE TIME
ACT_TOTALSCORE: 22
QUESTION_5 LAST FOUR WEEKS HOW WOULD YOU RATE YOUR ASTHMA CONTROL: WELL CONTROLLED

## 2022-09-06 ENCOUNTER — OFFICE VISIT (OUTPATIENT)
Dept: FAMILY MEDICINE | Facility: CLINIC | Age: 62
End: 2022-09-06
Payer: COMMERCIAL

## 2022-09-06 VITALS
DIASTOLIC BLOOD PRESSURE: 84 MMHG | WEIGHT: 247.75 LBS | BODY MASS INDEX: 37.67 KG/M2 | HEART RATE: 76 BPM | TEMPERATURE: 98.1 F | OXYGEN SATURATION: 96 % | SYSTOLIC BLOOD PRESSURE: 136 MMHG

## 2022-09-06 DIAGNOSIS — E11.9 TYPE 2 DIABETES MELLITUS WITHOUT COMPLICATION, WITHOUT LONG-TERM CURRENT USE OF INSULIN (H): Primary | ICD-10-CM

## 2022-09-06 LAB
CREAT UR-MCNC: 81.8 MG/DL
HBA1C MFR BLD: 6.7 % (ref 0–5.6)
MICROALBUMIN UR-MCNC: <12 MG/L
MICROALBUMIN/CREAT UR: NORMAL MG/G{CREAT}

## 2022-09-06 PROCEDURE — 36415 COLL VENOUS BLD VENIPUNCTURE: CPT | Performed by: FAMILY MEDICINE

## 2022-09-06 PROCEDURE — 83036 HEMOGLOBIN GLYCOSYLATED A1C: CPT | Performed by: FAMILY MEDICINE

## 2022-09-06 PROCEDURE — 82043 UR ALBUMIN QUANTITATIVE: CPT | Performed by: FAMILY MEDICINE

## 2022-09-06 PROCEDURE — 99213 OFFICE O/P EST LOW 20 MIN: CPT | Performed by: FAMILY MEDICINE

## 2022-09-06 NOTE — PROGRESS NOTES
"  Assessment & Plan     Type 2 diabetes mellitus without complication, without long-term current use of insulin (H)    - Albumin Random Urine Quantitative with Creat Ratio  - HEMOGLOBIN A1C  - Albumin Random Urine Quantitative with Creat Ratio               BMI:   Estimated body mass index is 37.67 kg/m  as calculated from the following:    Height as of 11/2/21: 1.727 m (5' 8\").    Weight as of this encounter: 112.4 kg (247 lb 12 oz).           Return in about 4 months (around 1/6/2023) for DM.    Rajiv Figueroa MD, MD  [facility] ALAN Tijerina is a 62 year old, presenting for the following health issues:  Diabetes      History of Present Illness       Hypertension: He presents for follow up of hypertension.  He does not check blood pressure  regularly outside of the clinic. Outpatient blood pressures have not been over 140/90. He follows a low salt diet.         Weight decreased another 4 pounds with careful eating.    He had pneumovax at age 52.    Current Outpatient Medications   Medication Sig Dispense Refill     amLODIPine (NORVASC) 5 MG tablet Take 1 tablet (5 mg) by mouth daily 90 tablet 3     cabergoline (DOSTINEX) 0.5 MG tablet TAKE HALF TABLET BY MOUTH TWICE WEEKLY 8 tablet 11     cholecalciferol, vitamin D3, (VITAMIN D3) 2,000 unit cap [CHOLECALCIFEROL, VITAMIN D3, (VITAMIN D3) 2,000 UNIT CAP] Take 1 capsule by mouth daily.       dorzolamide-timolol (COSOPT) 22.3-6.8 mg/mL ophthalmic solution [DORZOLAMIDE-TIMOLOL (COSOPT) 22.3-6.8 MG/ML OPHTHALMIC SOLUTION]   10     EPINEPHrine (ANY BX GENERIC EQUIV) 0.3 MG/0.3ML injection 2-pack Inject 0.3 mLs (0.3 mg) into the muscle as needed for anaphylaxis 1 each 1     FLOVENT  MCG/ACT inhaler INHALE 1 PUFF BY MOUTH TWICE DAILY 12 g 3     hydrochlorothiazide (HYDRODIURIL) 12.5 MG tablet TAKE ONE TABLET BY MOUTH ONE TIME DAILY 90 tablet 3     latanoprost (XALATAN) 0.005 % ophthalmic solution [LATANOPROST (XALATAN) 0.005 % " OPHTHALMIC SOLUTION]   10     lisinopril (ZESTRIL) 40 MG tablet TAKE ONE TABLET BY MOUTH ONE TIME DAILY 90 tablet 3     loratadine (CLARITIN) 10 mg tablet [LORATADINE (CLARITIN) 10 MG TABLET] Take 10 mg by mouth daily.       montelukast (SINGULAIR) 10 MG tablet TAKE ONE TABLET BY MOUTH ONE TIME DAILY 90 tablet 3     MULTIVITAMIN ORAL [MULTIVITAMIN ORAL] Take 1 tablet by mouth daily.       PROAIR HFA 90 mcg/actuation inhaler [PROAIR HFA 90 MCG/ACTUATION INHALER] INHALE ONE OR TWO PUFFS BY MOUTH EVERY FOUR HOURS AS NEEDED  8.5 g 6     venlafaxine (EFFEXOR-XR) 150 MG 24 hr capsule TAKE ONE CAPSULE BY MOUTH ONE TIME DAILY 90 capsule 3         Review of Systems   No fever.      Objective    BP (!) 142/78 (Cuff Size: Adult Regular)   Pulse 76   Temp 98.1  F (36.7  C)   Wt 112.4 kg (247 lb 12 oz)   SpO2 96%   BMI 37.67 kg/m    Body mass index is 37.67 kg/m .  Physical Exam   Heart normal  Lungs normal    A1c 6.7

## 2022-10-01 ENCOUNTER — HEALTH MAINTENANCE LETTER (OUTPATIENT)
Age: 62
End: 2022-10-01

## 2022-12-21 ENCOUNTER — IMMUNIZATION (OUTPATIENT)
Dept: NURSING | Facility: CLINIC | Age: 62
End: 2022-12-21
Payer: COMMERCIAL

## 2022-12-21 PROCEDURE — 0124A COVID-19 VACCINE BIVALENT BOOSTER 12+ (PFIZER): CPT

## 2022-12-21 PROCEDURE — 91312 COVID-19 VACCINE BIVALENT BOOSTER 12+ (PFIZER): CPT

## 2022-12-26 DIAGNOSIS — Z76.0 ENCOUNTER FOR MEDICATION REFILL: Primary | ICD-10-CM

## 2022-12-26 DIAGNOSIS — J45.30 MILD PERSISTENT ASTHMA WITHOUT COMPLICATION: ICD-10-CM

## 2022-12-29 RX ORDER — ALBUTEROL SULFATE 90 UG/1
AEROSOL, METERED RESPIRATORY (INHALATION)
Qty: 54 G | Refills: 3 | Status: SHIPPED | OUTPATIENT
Start: 2022-12-29 | End: 2024-06-16

## 2023-01-06 ENCOUNTER — OFFICE VISIT (OUTPATIENT)
Dept: FAMILY MEDICINE | Facility: CLINIC | Age: 63
End: 2023-01-06
Payer: COMMERCIAL

## 2023-01-06 VITALS
SYSTOLIC BLOOD PRESSURE: 138 MMHG | WEIGHT: 258 LBS | DIASTOLIC BLOOD PRESSURE: 80 MMHG | RESPIRATION RATE: 16 BRPM | HEIGHT: 68 IN | TEMPERATURE: 98.6 F | OXYGEN SATURATION: 98 % | HEART RATE: 72 BPM | BODY MASS INDEX: 39.1 KG/M2

## 2023-01-06 DIAGNOSIS — E11.9 TYPE 2 DIABETES MELLITUS WITHOUT COMPLICATION, WITHOUT LONG-TERM CURRENT USE OF INSULIN (H): Primary | ICD-10-CM

## 2023-01-06 DIAGNOSIS — E66.01 MORBID OBESITY (H): ICD-10-CM

## 2023-01-06 DIAGNOSIS — E78.2 MIXED HYPERLIPIDEMIA: ICD-10-CM

## 2023-01-06 DIAGNOSIS — I10 ESSENTIAL HYPERTENSION WITH GOAL BLOOD PRESSURE LESS THAN 140/90: ICD-10-CM

## 2023-01-06 DIAGNOSIS — Z23 NEED FOR VACCINATION: ICD-10-CM

## 2023-01-06 LAB
ALBUMIN SERPL BCG-MCNC: 4.3 G/DL (ref 3.5–5.2)
ALP SERPL-CCNC: 101 U/L (ref 40–129)
ALT SERPL W P-5'-P-CCNC: 26 U/L (ref 10–50)
ANION GAP SERPL CALCULATED.3IONS-SCNC: 13 MMOL/L (ref 7–15)
AST SERPL W P-5'-P-CCNC: 23 U/L (ref 10–50)
BILIRUB SERPL-MCNC: 0.5 MG/DL
BUN SERPL-MCNC: 15.1 MG/DL (ref 8–23)
CALCIUM SERPL-MCNC: 9 MG/DL (ref 8.8–10.2)
CHLORIDE SERPL-SCNC: 100 MMOL/L (ref 98–107)
CHOLEST SERPL-MCNC: 196 MG/DL
CREAT SERPL-MCNC: 0.61 MG/DL (ref 0.67–1.17)
DEPRECATED HCO3 PLAS-SCNC: 27 MMOL/L (ref 22–29)
GFR SERPL CREATININE-BSD FRML MDRD: >90 ML/MIN/1.73M2
GLUCOSE SERPL-MCNC: 168 MG/DL (ref 70–99)
HBA1C MFR BLD: 7.3 % (ref 0–5.6)
HDLC SERPL-MCNC: 55 MG/DL
LDLC SERPL CALC-MCNC: 126 MG/DL
NONHDLC SERPL-MCNC: 141 MG/DL
POTASSIUM SERPL-SCNC: 4.5 MMOL/L (ref 3.4–5.3)
PROT SERPL-MCNC: 7.2 G/DL (ref 6.4–8.3)
SODIUM SERPL-SCNC: 140 MMOL/L (ref 136–145)
TRIGL SERPL-MCNC: 73 MG/DL

## 2023-01-06 PROCEDURE — 90471 IMMUNIZATION ADMIN: CPT | Performed by: FAMILY MEDICINE

## 2023-01-06 PROCEDURE — 83036 HEMOGLOBIN GLYCOSYLATED A1C: CPT | Performed by: FAMILY MEDICINE

## 2023-01-06 PROCEDURE — 90682 RIV4 VACC RECOMBINANT DNA IM: CPT | Performed by: FAMILY MEDICINE

## 2023-01-06 PROCEDURE — 80053 COMPREHEN METABOLIC PANEL: CPT | Performed by: FAMILY MEDICINE

## 2023-01-06 PROCEDURE — 99214 OFFICE O/P EST MOD 30 MIN: CPT | Mod: 25 | Performed by: FAMILY MEDICINE

## 2023-01-06 PROCEDURE — 36415 COLL VENOUS BLD VENIPUNCTURE: CPT | Performed by: FAMILY MEDICINE

## 2023-01-06 PROCEDURE — 80061 LIPID PANEL: CPT | Performed by: FAMILY MEDICINE

## 2023-01-06 ASSESSMENT — PATIENT HEALTH QUESTIONNAIRE - PHQ9
10. IF YOU CHECKED OFF ANY PROBLEMS, HOW DIFFICULT HAVE THESE PROBLEMS MADE IT FOR YOU TO DO YOUR WORK, TAKE CARE OF THINGS AT HOME, OR GET ALONG WITH OTHER PEOPLE: SOMEWHAT DIFFICULT
SUM OF ALL RESPONSES TO PHQ QUESTIONS 1-9: 3
SUM OF ALL RESPONSES TO PHQ QUESTIONS 1-9: 3

## 2023-01-06 NOTE — PROGRESS NOTES
"  Assessment & Plan     Type 2 diabetes mellitus without complication, without long-term current use of insulin (H)    Stable.  Continue diet control.    - Comprehensive metabolic panel (BMP + Alb, Alk Phos, ALT, AST, Total. Bili, TP)  - Comprehensive metabolic panel (BMP + Alb, Alk Phos, ALT, AST, Total. Bili, TP)    Essential hypertension with goal blood pressure less than 140/90    Stable.  Continue the 3 BP meds at same doses.  Minimize Aleve, which can increase BP    Mixed hyperlipidemia    Not controlled.  If LDL still above 100, he agrees to begin statin.    - Lipid panel reflex to direct LDL Fasting  - Lipid panel reflex to direct LDL Fasting    Morbid obesity (H)    Avoid high-calorie foods.      Need for vaccination    - INFLUENZA VACCINE 50-64 OR 18-64 W/EGG ALLERGY (FLUBLOK)      Ordering of each unique test  Prescription drug management         BMI:   Estimated body mass index is 39.23 kg/m  as calculated from the following:    Height as of this encounter: 1.727 m (5' 8\").    Weight as of this encounter: 117 kg (258 lb).           Return in about 4 months (around 5/6/2023) for DM.    Rajiv Figueroa MD  [facility] ALAN Tijerina is a 62 year old, presenting for the following health issues:  Diabetes      History of Present Illness       Hypertension: He presents for follow up of hypertension.  He does not check blood pressure  regularly outside of the clinic. Outside blood pressures have been over 140/90. He follows a low salt diet.      Today's PHQ-9         PHQ-9 Total Score: 3    PHQ-9 Q9 Thoughts of better off dead/self-harm past 2 weeks :   Not at all    How difficult have these problems made it for you to do your work, take care of things at home, or get along with other people: Somewhat difficult       Diet controlled diabetes.    Takes amlodipine, lisinopril and hydrochlorothiazide for hypertension.  Has been taking Aleve occasionally.    Current Outpatient " "Medications   Medication Sig Dispense Refill     amLODIPine (NORVASC) 5 MG tablet Take 1 tablet (5 mg) by mouth daily 90 tablet 3     cabergoline (DOSTINEX) 0.5 MG tablet TAKE 1/2 TABLET BY MOUTH TWICE WEEKLY 8 tablet 11     cholecalciferol, vitamin D3, (VITAMIN D3) 2,000 unit cap [CHOLECALCIFEROL, VITAMIN D3, (VITAMIN D3) 2,000 UNIT CAP] Take 1 capsule by mouth daily.       dorzolamide-timolol (COSOPT) 22.3-6.8 mg/mL ophthalmic solution [DORZOLAMIDE-TIMOLOL (COSOPT) 22.3-6.8 MG/ML OPHTHALMIC SOLUTION]   10     EPINEPHrine (ANY BX GENERIC EQUIV) 0.3 MG/0.3ML injection 2-pack Inject 0.3 mLs (0.3 mg) into the muscle as needed for anaphylaxis 1 each 1     FLOVENT  MCG/ACT inhaler INHALE 1 PUFF BY MOUTH TWICE DAILY 12 g 3     hydrochlorothiazide (HYDRODIURIL) 12.5 MG tablet TAKE ONE TABLET BY MOUTH ONE TIME DAILY 90 tablet 3     latanoprost (XALATAN) 0.005 % ophthalmic solution [LATANOPROST (XALATAN) 0.005 % OPHTHALMIC SOLUTION]   10     lisinopril (ZESTRIL) 40 MG tablet TAKE ONE TABLET BY MOUTH ONE TIME DAILY 90 tablet 3     loratadine (CLARITIN) 10 mg tablet [LORATADINE (CLARITIN) 10 MG TABLET] Take 10 mg by mouth daily.       montelukast (SINGULAIR) 10 MG tablet TAKE ONE TABLET BY MOUTH ONE TIME DAILY 90 tablet 3     MULTIVITAMIN ORAL [MULTIVITAMIN ORAL] Take 1 tablet by mouth daily.       PROAIR  (90 Base) MCG/ACT inhaler INHALE ONE OR TWO PUFFS BY MOUTH EVERY FOUR HOURS AS NEEDED 54 g 3     venlafaxine (EFFEXOR-XR) 150 MG 24 hr capsule TAKE ONE CAPSULE BY MOUTH ONE TIME DAILY 90 capsule 3         Review of Systems   No fever or cough.      Objective    /80   Pulse 72   Temp 98.6  F (37  C) (Oral)   Resp 16   Ht 1.727 m (5' 8\")   Wt 117 kg (258 lb)   SpO2 98%   BMI 39.23 kg/m    Body mass index is 39.23 kg/m .  Physical Exam   Heart normal  Lungs normal  Feet normal                    "

## 2023-04-02 DIAGNOSIS — F01.53 VASCULAR DEMENTIA WITH DEPRESSED MOOD (H): ICD-10-CM

## 2023-04-02 DIAGNOSIS — Z76.0 ENCOUNTER FOR MEDICATION REFILL: ICD-10-CM

## 2023-04-02 RX ORDER — VENLAFAXINE HYDROCHLORIDE 150 MG/1
CAPSULE, EXTENDED RELEASE ORAL
Qty: 90 CAPSULE | Refills: 2 | Status: SHIPPED | OUTPATIENT
Start: 2023-04-02 | End: 2023-12-19

## 2023-04-03 NOTE — TELEPHONE ENCOUNTER
"Last Written Prescription Date:  4/14/2022  Last Fill Quantity: 90,  # refills: 3   Last office visit provider:  1/6/2023     Requested Prescriptions   Pending Prescriptions Disp Refills     venlafaxine (EFFEXOR XR) 150 MG 24 hr capsule [Pharmacy Med Name: Venlafaxine HCl ER Oral Capsule Extended Release 24 Hour 150 MG] 90 capsule 0     Sig: TAKE ONE CAPSULE BY MOUTH ONE TIME DAILY       Serotonin-Norepinephrine Reuptake Inhibitors  Failed - 4/2/2023  9:38 AM        Failed - Normal serum creatinine on file in past 12 months     Recent Labs   Lab Test 01/06/23  0846   CR 0.61*       Ok to refill medication if creatinine is low          Passed - Blood pressure under 140/90 in past 12 months     BP Readings from Last 3 Encounters:   01/06/23 138/80   09/06/22 136/84   07/05/22 (!) 144/80                 Passed - PHQ-9 score of less than 5 in past 6 months     Please review last PHQ-9 score.           Passed - Medication is active on med list        Passed - Patient is age 18 or older        Passed - Recent (6 mo) or future (30 days) visit within the authorizing provider's specialty     Patient had office visit in the last 6 months or has a visit in the next 30 days with authorizing provider or within the authorizing provider's specialty.  See \"Patient Info\" tab in inbasket, or \"Choose Columns\" in Meds & Orders section of the refill encounter.                 Meenakshi Mesa RN 04/02/23 8:53 PM  "

## 2023-04-26 ENCOUNTER — TRANSFERRED RECORDS (OUTPATIENT)
Dept: MULTI SPECIALTY CLINIC | Facility: CLINIC | Age: 63
End: 2023-04-26

## 2023-04-26 LAB — RETINOPATHY: NORMAL

## 2023-05-08 ASSESSMENT — ASTHMA QUESTIONNAIRES
QUESTION_2 LAST FOUR WEEKS HOW OFTEN HAVE YOU HAD SHORTNESS OF BREATH: ONCE OR TWICE A WEEK
QUESTION_4 LAST FOUR WEEKS HOW OFTEN HAVE YOU USED YOUR RESCUE INHALER OR NEBULIZER MEDICATION (SUCH AS ALBUTEROL): ONCE A WEEK OR LESS
QUESTION_3 LAST FOUR WEEKS HOW OFTEN DID YOUR ASTHMA SYMPTOMS (WHEEZING, COUGHING, SHORTNESS OF BREATH, CHEST TIGHTNESS OR PAIN) WAKE YOU UP AT NIGHT OR EARLIER THAN USUAL IN THE MORNING: NOT AT ALL
QUESTION_1 LAST FOUR WEEKS HOW MUCH OF THE TIME DID YOUR ASTHMA KEEP YOU FROM GETTING AS MUCH DONE AT WORK, SCHOOL OR AT HOME: NONE OF THE TIME
ACT_TOTALSCORE: 22
QUESTION_5 LAST FOUR WEEKS HOW WOULD YOU RATE YOUR ASTHMA CONTROL: WELL CONTROLLED
ACT_TOTALSCORE: 22

## 2023-05-09 ENCOUNTER — OFFICE VISIT (OUTPATIENT)
Dept: FAMILY MEDICINE | Facility: CLINIC | Age: 63
End: 2023-05-09
Payer: COMMERCIAL

## 2023-05-09 VITALS
HEART RATE: 77 BPM | TEMPERATURE: 98 F | WEIGHT: 253.5 LBS | OXYGEN SATURATION: 93 % | DIASTOLIC BLOOD PRESSURE: 78 MMHG | HEIGHT: 68 IN | RESPIRATION RATE: 16 BRPM | SYSTOLIC BLOOD PRESSURE: 152 MMHG | BODY MASS INDEX: 38.42 KG/M2

## 2023-05-09 DIAGNOSIS — I10 ESSENTIAL HYPERTENSION WITH GOAL BLOOD PRESSURE LESS THAN 140/90: ICD-10-CM

## 2023-05-09 DIAGNOSIS — E78.2 MIXED HYPERLIPIDEMIA: ICD-10-CM

## 2023-05-09 DIAGNOSIS — Z12.11 SCREEN FOR COLON CANCER: ICD-10-CM

## 2023-05-09 DIAGNOSIS — D35.2 PITUITARY ADENOMA (H): ICD-10-CM

## 2023-05-09 DIAGNOSIS — E29.1 TESTICULAR HYPOFUNCTION: ICD-10-CM

## 2023-05-09 DIAGNOSIS — E11.9 TYPE 2 DIABETES MELLITUS WITHOUT COMPLICATION, WITHOUT LONG-TERM CURRENT USE OF INSULIN (H): Primary | ICD-10-CM

## 2023-05-09 LAB
HBA1C MFR BLD: 7.3 % (ref 0–5.6)
PROLACTIN SERPL 3RD IS-MCNC: 8 NG/ML (ref 4–15)

## 2023-05-09 PROCEDURE — 99214 OFFICE O/P EST MOD 30 MIN: CPT | Performed by: FAMILY MEDICINE

## 2023-05-09 PROCEDURE — 84403 ASSAY OF TOTAL TESTOSTERONE: CPT | Performed by: FAMILY MEDICINE

## 2023-05-09 PROCEDURE — 83036 HEMOGLOBIN GLYCOSYLATED A1C: CPT | Performed by: FAMILY MEDICINE

## 2023-05-09 PROCEDURE — 36415 COLL VENOUS BLD VENIPUNCTURE: CPT | Performed by: FAMILY MEDICINE

## 2023-05-09 PROCEDURE — 84146 ASSAY OF PROLACTIN: CPT | Performed by: FAMILY MEDICINE

## 2023-05-09 NOTE — PROGRESS NOTES
"  Assessment & Plan     Type 2 diabetes mellitus without complication, without long-term current use of insulin (H)    Stable.  Continue diet control.    - HEMOGLOBIN A1C  - HEMOGLOBIN A1C  - PRIMARY CARE FOLLOW-UP SCHEDULING    Essential hypertension with goal blood pressure less than 140/90    Not controlled.  Increase amlodipine to 10 mg.    - amLODIPine (NORVASC) 10 MG tablet  Dispense: 90 tablet; Refill: 3    Mixed hyperlipidemia    Not at goal of <100.  He agrees to begin atorvastatin.    - atorvastatin (LIPITOR) 20 MG tablet  Dispense: 90 tablet; Refill: 3    Screen for colon cancer    - CHINO([employer])    Pituitary adenoma (H)    - Prolactin  - Prolactin    Testicular hypofunction    - Testosterone, total  - Testosterone, total      Ordering of each unique test  Prescription drug management     34 minutes spent by me on the date of the encounter doing chart review, review of test results and patient visit regarding diabetes, HTN, HLD.       BMI:   Estimated body mass index is 38.54 kg/m  as calculated from the following:    Height as of this encounter: 1.727 m (5' 8\").    Weight as of this encounter: 115 kg (253 lb 8 oz).           Rajiv Figueroa MD  [facility] ALAN Tijerina is a 62 year old, presenting for the following health issues:  Diabetes        5/9/2023     7:19 AM   Additional Questions   Roomed by Roberta LAMB     History of Present Illness       Hypertension: He presents for follow up of hypertension.  He does not check blood pressure  regularly outside of the clinic. Outpatient blood pressures have not been over 140/90. He follows a low salt diet.     He eats 2-3 servings of fruits and vegetables daily.He consumes 0 sweetened beverage(s) daily.He exercises with enough effort to increase his heart rate 10 to 19 minutes per day.  He exercises with enough effort to increase his heart rate 5 days per week.   He is taking medications regularly.       He has been " "eating more salads.  Weight decreased 5 # since January.    LDL was 126.    Current Outpatient Medications   Medication Sig Dispense Refill     amLODIPine (NORVASC) 10 MG tablet Take 1 tablet (10 mg) by mouth daily 90 tablet 3     atorvastatin (LIPITOR) 20 MG tablet Take 1 tablet (20 mg) by mouth daily 90 tablet 3     cabergoline (DOSTINEX) 0.5 MG tablet TAKE 1/2 TABLET BY MOUTH TWICE WEEKLY 8 tablet 11     cholecalciferol, vitamin D3, (VITAMIN D3) 2,000 unit cap [CHOLECALCIFEROL, VITAMIN D3, (VITAMIN D3) 2,000 UNIT CAP] Take 1 capsule by mouth daily.       dorzolamide-timolol (COSOPT) 22.3-6.8 mg/mL ophthalmic solution [DORZOLAMIDE-TIMOLOL (COSOPT) 22.3-6.8 MG/ML OPHTHALMIC SOLUTION]   10     EPINEPHrine (ANY BX GENERIC EQUIV) 0.3 MG/0.3ML injection 2-pack Inject 0.3 mLs (0.3 mg) into the muscle as needed for anaphylaxis 1 each 1     FLOVENT  MCG/ACT inhaler INHALE 1 PUFF BY MOUTH TWICE DAILY 12 g 3     hydrochlorothiazide (HYDRODIURIL) 12.5 MG tablet TAKE ONE TABLET BY MOUTH ONE TIME DAILY 90 tablet 3     latanoprost (XALATAN) 0.005 % ophthalmic solution [LATANOPROST (XALATAN) 0.005 % OPHTHALMIC SOLUTION]   10     lisinopril (ZESTRIL) 40 MG tablet TAKE ONE TABLET BY MOUTH ONE TIME DAILY 90 tablet 3     loratadine (CLARITIN) 10 mg tablet [LORATADINE (CLARITIN) 10 MG TABLET] Take 10 mg by mouth daily.       montelukast (SINGULAIR) 10 MG tablet TAKE ONE TABLET BY MOUTH ONE TIME DAILY 90 tablet 3     MULTIVITAMIN ORAL [MULTIVITAMIN ORAL] Take 1 tablet by mouth daily.       PROAIR  (90 Base) MCG/ACT inhaler INHALE ONE OR TWO PUFFS BY MOUTH EVERY FOUR HOURS AS NEEDED 54 g 3     venlafaxine (EFFEXOR XR) 150 MG 24 hr capsule TAKE ONE CAPSULE BY MOUTH ONE TIME DAILY 90 capsule 2           Review of Systems   No fever or cough.      Objective    BP (!) 152/78   Pulse 77   Temp 98  F (36.7  C) (Oral)   Resp 16   Ht 1.727 m (5' 8\")   Wt 115 kg (253 lb 8 oz)   SpO2 93%   BMI 38.54 kg/m    Body mass index " is 38.54 kg/m .  Physical Exam   Heart normal  Lungs normal

## 2023-05-11 LAB — TESTOST SERPL-MCNC: 293 NG/DL (ref 240–950)

## 2023-05-22 ENCOUNTER — LAB (OUTPATIENT)
Dept: FAMILY MEDICINE | Facility: CLINIC | Age: 63
End: 2023-05-22
Payer: COMMERCIAL

## 2023-05-22 DIAGNOSIS — Z12.11 SCREEN FOR COLON CANCER: ICD-10-CM

## 2023-05-22 RX ORDER — AMLODIPINE BESYLATE 10 MG/1
10 TABLET ORAL DAILY
Qty: 90 TABLET | Refills: 3 | Status: SHIPPED | OUTPATIENT
Start: 2023-05-22 | End: 2024-05-13

## 2023-05-22 RX ORDER — ATORVASTATIN CALCIUM 20 MG/1
20 TABLET, FILM COATED ORAL DAILY
Qty: 90 TABLET | Refills: 3 | Status: SHIPPED | OUTPATIENT
Start: 2023-05-22 | End: 2024-05-13

## 2023-07-05 ENCOUNTER — TELEPHONE (OUTPATIENT)
Dept: FAMILY MEDICINE | Facility: CLINIC | Age: 63
End: 2023-07-05
Payer: COMMERCIAL

## 2023-07-05 NOTE — TELEPHONE ENCOUNTER
Patient Quality Outreach    Patient is due for the following:   Diabetes -  Diabetic Follow-Up Visit    Next Steps:   Patient was scheduled for office visit   DM and HTN    Type of outreach:    has appt scheduled for 9/2023     Next Steps:  Reach out within 90 days via Phone.    Max number of attempts reached: No. Will try again in 90 days if patient still on fail list.    Questions for provider review:    None           Kera Peace

## 2023-07-29 DIAGNOSIS — J30.1 CHRONIC SEASONAL ALLERGIC RHINITIS DUE TO POLLEN: ICD-10-CM

## 2023-07-29 DIAGNOSIS — J45.30 MILD PERSISTENT ASTHMA WITHOUT COMPLICATION: ICD-10-CM

## 2023-07-29 RX ORDER — MONTELUKAST SODIUM 10 MG/1
TABLET ORAL
Qty: 90 TABLET | Refills: 3 | Status: SHIPPED | OUTPATIENT
Start: 2023-07-29 | End: 2024-07-06

## 2023-07-29 NOTE — TELEPHONE ENCOUNTER
"Last Written Prescription Date:  8/1/2022  Last Fill Quantity: 90,  # refills: 3   Last office visit provider:  5/9/2023     Requested Prescriptions   Pending Prescriptions Disp Refills    montelukast (SINGULAIR) 10 MG tablet [Pharmacy Med Name: Montelukast Sodium Oral Tablet 10 MG] 90 tablet 0     Sig: TAKE ONE TABLET BY MOUTH ONE TIME DAILY       Leukotriene Inhibitors Protocol Passed - 7/29/2023  9:43 AM        Passed - Patient is age 12 or older     If patient is under 16, ok to refill using age based dosing.           Passed - Asthma control assessment score within normal limits in last 6 months     Please review ACT score.           Passed - Medication is active on med list        Passed - Recent (6 mo) or future (30 days) visit within the authorizing provider's specialty     Patient had office visit in the last 6 months or has a visit in the next 30 days with authorizing provider or within the authorizing provider's specialty.  See \"Patient Info\" tab in inbasket, or \"Choose Columns\" in Meds & Orders section of the refill encounter.                 Rylie Nicole RN 07/29/23 3:17 PM  "

## 2023-08-06 ENCOUNTER — HEALTH MAINTENANCE LETTER (OUTPATIENT)
Age: 63
End: 2023-08-06

## 2023-09-12 ENCOUNTER — OFFICE VISIT (OUTPATIENT)
Dept: FAMILY MEDICINE | Facility: CLINIC | Age: 63
End: 2023-09-12
Attending: FAMILY MEDICINE
Payer: COMMERCIAL

## 2023-09-12 VITALS
BODY MASS INDEX: 38.49 KG/M2 | HEART RATE: 74 BPM | TEMPERATURE: 98 F | SYSTOLIC BLOOD PRESSURE: 136 MMHG | RESPIRATION RATE: 16 BRPM | WEIGHT: 254 LBS | OXYGEN SATURATION: 95 % | HEIGHT: 68 IN | DIASTOLIC BLOOD PRESSURE: 83 MMHG

## 2023-09-12 DIAGNOSIS — Z23 NEED FOR VACCINATION: ICD-10-CM

## 2023-09-12 DIAGNOSIS — E78.2 MIXED HYPERLIPIDEMIA: ICD-10-CM

## 2023-09-12 DIAGNOSIS — E11.9 TYPE 2 DIABETES MELLITUS WITHOUT COMPLICATION, WITHOUT LONG-TERM CURRENT USE OF INSULIN (H): Primary | ICD-10-CM

## 2023-09-12 DIAGNOSIS — I10 ESSENTIAL HYPERTENSION WITH GOAL BLOOD PRESSURE LESS THAN 140/90: ICD-10-CM

## 2023-09-12 LAB
CHOLEST SERPL-MCNC: 151 MG/DL
CREAT UR-MCNC: 59.4 MG/DL
HBA1C MFR BLD: 7.7 % (ref 0–5.6)
HDLC SERPL-MCNC: 60 MG/DL
LDLC SERPL CALC-MCNC: 73 MG/DL
MICROALBUMIN UR-MCNC: 16.5 MG/L
MICROALBUMIN/CREAT UR: 27.78 MG/G CR (ref 0–17)
NONHDLC SERPL-MCNC: 91 MG/DL
TRIGL SERPL-MCNC: 88 MG/DL

## 2023-09-12 PROCEDURE — 36415 COLL VENOUS BLD VENIPUNCTURE: CPT | Performed by: FAMILY MEDICINE

## 2023-09-12 PROCEDURE — 80061 LIPID PANEL: CPT | Performed by: FAMILY MEDICINE

## 2023-09-12 PROCEDURE — 82570 ASSAY OF URINE CREATININE: CPT | Performed by: FAMILY MEDICINE

## 2023-09-12 PROCEDURE — 90471 IMMUNIZATION ADMIN: CPT | Performed by: FAMILY MEDICINE

## 2023-09-12 PROCEDURE — 83036 HEMOGLOBIN GLYCOSYLATED A1C: CPT | Performed by: FAMILY MEDICINE

## 2023-09-12 PROCEDURE — 99214 OFFICE O/P EST MOD 30 MIN: CPT | Mod: 25 | Performed by: FAMILY MEDICINE

## 2023-09-12 PROCEDURE — 82043 UR ALBUMIN QUANTITATIVE: CPT | Performed by: FAMILY MEDICINE

## 2023-09-12 PROCEDURE — 90682 RIV4 VACC RECOMBINANT DNA IM: CPT | Performed by: FAMILY MEDICINE

## 2023-09-12 RX ORDER — METFORMIN HCL 500 MG
500 TABLET, EXTENDED RELEASE 24 HR ORAL DAILY
Qty: 90 TABLET | Refills: 3 | Status: SHIPPED | OUTPATIENT
Start: 2023-09-12 | End: 2024-09-03

## 2023-09-12 ASSESSMENT — PATIENT HEALTH QUESTIONNAIRE - PHQ9
SUM OF ALL RESPONSES TO PHQ QUESTIONS 1-9: 4
SUM OF ALL RESPONSES TO PHQ QUESTIONS 1-9: 4
10. IF YOU CHECKED OFF ANY PROBLEMS, HOW DIFFICULT HAVE THESE PROBLEMS MADE IT FOR YOU TO DO YOUR WORK, TAKE CARE OF THINGS AT HOME, OR GET ALONG WITH OTHER PEOPLE: SOMEWHAT DIFFICULT

## 2023-11-17 ENCOUNTER — IMMUNIZATION (OUTPATIENT)
Dept: FAMILY MEDICINE | Facility: CLINIC | Age: 63
End: 2023-11-17
Payer: COMMERCIAL

## 2023-11-17 DIAGNOSIS — Z23 ENCOUNTER FOR IMMUNIZATION: Primary | ICD-10-CM

## 2023-11-17 PROCEDURE — 99207 PR NO CHARGE NURSE ONLY: CPT

## 2023-11-17 PROCEDURE — 90480 ADMN SARSCOV2 VAC 1/ONLY CMP: CPT

## 2023-11-17 PROCEDURE — 91320 SARSCV2 VAC 30MCG TRS-SUC IM: CPT

## 2023-11-17 NOTE — PROGRESS NOTES
Prior to immunization administration, verified patients identity using patient s name and date of birth. Please see Immunization Activity for additional information.     Screening Questionnaire for Adult Immunization    Are you sick today?   No   Do you have allergies to medications, food, a vaccine component or latex?   No   Have you ever had a serious reaction after receiving a vaccination?   No   Do you have a long-term health problem with heart, lung, kidney, or metabolic disease (e.g., diabetes), asthma, a blood disorder, no spleen, complement component deficiency, a cochlear implant, or a spinal fluid leak?  Are you on long-term aspirin therapy?   No   Do you have cancer, leukemia, HIV/AIDS, or any other immune system problem?   No   Do you have a parent, brother, or sister with an immune system problem?   No   In the past 3 months, have you taken medications that affect  your immune system, such as prednisone, other steroids, or anticancer drugs; drugs for the treatment of rheumatoid arthritis, Crohn s disease, or psoriasis; or have you had radiation treatments?   No   Have you had a seizure, or a brain or other nervous system problem?   No   During the past year, have you received a transfusion of blood or blood    products, or been given immune (gamma) globulin or antiviral drug?   No   For women: Are you pregnant or is there a chance you could become       pregnant during the next month?   No   Have you received any vaccinations in the past 4 weeks?   No     Immunization questionnaire answers were all negative.    I have reviewed the following standing orders:   This patient is due and qualifies for the Covid-19 vaccine.     Click here for COVID-19 Standing Order    I have reviewed the vaccines inclusion and exclusion criteria; No concerns regarding eligibility.     Patient instructed to remain in clinic for 15 minutes afterwards, and to report any adverse reactions.     Screening performed by Alisha  JULIAN Ivy on 11/17/2023 at 10:04 AM.

## 2023-12-19 DIAGNOSIS — F01.53 VASCULAR DEMENTIA WITH DEPRESSED MOOD (H): ICD-10-CM

## 2023-12-19 DIAGNOSIS — Z76.0 ENCOUNTER FOR MEDICATION REFILL: ICD-10-CM

## 2023-12-19 RX ORDER — VENLAFAXINE HYDROCHLORIDE 150 MG/1
CAPSULE, EXTENDED RELEASE ORAL
Qty: 90 CAPSULE | Refills: 0 | Status: SHIPPED | OUTPATIENT
Start: 2023-12-19 | End: 2024-03-14

## 2023-12-24 ENCOUNTER — HEALTH MAINTENANCE LETTER (OUTPATIENT)
Age: 63
End: 2023-12-24

## 2024-01-10 ASSESSMENT — ASTHMA QUESTIONNAIRES
QUESTION_3 LAST FOUR WEEKS HOW OFTEN DID YOUR ASTHMA SYMPTOMS (WHEEZING, COUGHING, SHORTNESS OF BREATH, CHEST TIGHTNESS OR PAIN) WAKE YOU UP AT NIGHT OR EARLIER THAN USUAL IN THE MORNING: NOT AT ALL
QUESTION_2 LAST FOUR WEEKS HOW OFTEN HAVE YOU HAD SHORTNESS OF BREATH: ONCE OR TWICE A WEEK
QUESTION_4 LAST FOUR WEEKS HOW OFTEN HAVE YOU USED YOUR RESCUE INHALER OR NEBULIZER MEDICATION (SUCH AS ALBUTEROL): ONCE A WEEK OR LESS
ACT_TOTALSCORE: 22
ACT_TOTALSCORE: 22
QUESTION_5 LAST FOUR WEEKS HOW WOULD YOU RATE YOUR ASTHMA CONTROL: WELL CONTROLLED
QUESTION_1 LAST FOUR WEEKS HOW MUCH OF THE TIME DID YOUR ASTHMA KEEP YOU FROM GETTING AS MUCH DONE AT WORK, SCHOOL OR AT HOME: NONE OF THE TIME

## 2024-01-12 ENCOUNTER — OFFICE VISIT (OUTPATIENT)
Dept: FAMILY MEDICINE | Facility: CLINIC | Age: 64
End: 2024-01-12
Attending: FAMILY MEDICINE
Payer: COMMERCIAL

## 2024-01-12 VITALS
RESPIRATION RATE: 20 BRPM | WEIGHT: 249 LBS | OXYGEN SATURATION: 95 % | SYSTOLIC BLOOD PRESSURE: 136 MMHG | DIASTOLIC BLOOD PRESSURE: 75 MMHG | HEART RATE: 75 BPM | BODY MASS INDEX: 36.88 KG/M2 | HEIGHT: 69 IN | TEMPERATURE: 98.2 F

## 2024-01-12 DIAGNOSIS — D35.2 PITUITARY ADENOMA (H): ICD-10-CM

## 2024-01-12 DIAGNOSIS — F33.1 MAJOR DEPRESSIVE DISORDER, RECURRENT EPISODE, MODERATE (H): ICD-10-CM

## 2024-01-12 DIAGNOSIS — I10 ESSENTIAL HYPERTENSION WITH GOAL BLOOD PRESSURE LESS THAN 140/90: ICD-10-CM

## 2024-01-12 DIAGNOSIS — E22.1 HYPERPROLACTINEMIA (H): ICD-10-CM

## 2024-01-12 DIAGNOSIS — E66.01 MORBID OBESITY (H): ICD-10-CM

## 2024-01-12 DIAGNOSIS — E11.9 TYPE 2 DIABETES MELLITUS WITHOUT COMPLICATION, WITHOUT LONG-TERM CURRENT USE OF INSULIN (H): Primary | ICD-10-CM

## 2024-01-12 LAB
HBA1C MFR BLD: 7.2 % (ref 0–5.6)
HOLD SPECIMEN: NORMAL

## 2024-01-12 PROCEDURE — 83036 HEMOGLOBIN GLYCOSYLATED A1C: CPT | Performed by: FAMILY MEDICINE

## 2024-01-12 PROCEDURE — 99214 OFFICE O/P EST MOD 30 MIN: CPT | Performed by: FAMILY MEDICINE

## 2024-01-12 PROCEDURE — 80053 COMPREHEN METABOLIC PANEL: CPT | Performed by: FAMILY MEDICINE

## 2024-01-12 PROCEDURE — 36415 COLL VENOUS BLD VENIPUNCTURE: CPT | Performed by: FAMILY MEDICINE

## 2024-01-12 NOTE — PROGRESS NOTES
"  Assessment & Plan     Type 2 diabetes mellitus without complication, without long-term current use of insulin (H)    Improved control.    Recheck in 4 months.      - PRIMARY CARE FOLLOW-UP SCHEDULING  - HEMOGLOBIN A1C  - HEMOGLOBIN A1C  - PRIMARY CARE FOLLOW-UP SCHEDULING    Essential hypertension with goal blood pressure less than 140/90    Controlled.    - Comprehensive metabolic panel (BMP + Alb, Alk Phos, ALT, AST, Total. Bili, TP)  - Comprehensive metabolic panel (BMP + Alb, Alk Phos, ALT, AST, Total. Bili, TP)    Major depressive disorder, recurrent episode, moderate (H)    Stable.      Hyperprolactinemia (H24)    Stable.  Continue Cabergoline.      Morbid obesity (H)    Improving.  Has diabetes and hypertension as comorbidities.      Pituitary adenoma (H)    Stable.      Review of the result(s) of each unique test - LDL 73 in Sept  Ordering of each unique test  Prescription drug management        BMI  Estimated body mass index is 37.31 kg/m  as calculated from the following:    Height as of this encounter: 1.74 m (5' 8.5\").    Weight as of this encounter: 112.9 kg (249 lb).           Daniel Tijerina is a 63 year old, presenting for the following health issues:  Diabetes and Hypertension        1/12/2024     7:56 AM   Additional Questions   Roomed by Edgar PERSON       History of Present Illness       Diabetes:   He presents for follow up of diabetes.    He is not checking blood glucose.         He has no concerns regarding his diabetes at this time.   He is not experiencing numbness or burning in feet, excessive thirst, blurry vision, weight changes or redness, sores or blisters on feet.                     He has been eating more vegetables.    Depression doing OK.    Dog has cancer.    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    Weight decreased 5 # since September.    Current Outpatient Medications   Medication Sig Dispense Refill    amLODIPine (NORVASC) 10 MG tablet Take 1 tablet (10 mg) by mouth daily 90 tablet " "3    atorvastatin (LIPITOR) 20 MG tablet Take 1 tablet (20 mg) by mouth daily 90 tablet 3    cabergoline (DOSTINEX) 0.5 MG tablet TAKE 1/2 TABLET BY MOUTH TWICE WEEKLY 8 tablet 11    cholecalciferol, vitamin D3, (VITAMIN D3) 2,000 unit cap [CHOLECALCIFEROL, VITAMIN D3, (VITAMIN D3) 2,000 UNIT CAP] Take 1 capsule by mouth daily.      dorzolamide-timolol (COSOPT) 22.3-6.8 mg/mL ophthalmic solution [DORZOLAMIDE-TIMOLOL (COSOPT) 22.3-6.8 MG/ML OPHTHALMIC SOLUTION]   10    EPINEPHrine (ANY BX GENERIC EQUIV) 0.3 MG/0.3ML injection 2-pack Inject 0.3 mLs (0.3 mg) into the muscle as needed for anaphylaxis 1 each 1    FLOVENT  MCG/ACT inhaler INHALE 1 PUFF BY MOUTH TWICE DAILY 12 g 3    hydrochlorothiazide (HYDRODIURIL) 12.5 MG tablet TAKE ONE TABLET BY MOUTH ONE TIME DAILY 90 tablet 3    latanoprost (XALATAN) 0.005 % ophthalmic solution [LATANOPROST (XALATAN) 0.005 % OPHTHALMIC SOLUTION]   10    lisinopril (ZESTRIL) 40 MG tablet TAKE ONE TABLET BY MOUTH ONE TIME DAILY 90 tablet 3    loratadine (CLARITIN) 10 mg tablet [LORATADINE (CLARITIN) 10 MG TABLET] Take 10 mg by mouth daily.      metFORMIN (GLUCOPHAGE XR) 500 MG 24 hr tablet Take 1 tablet (500 mg) by mouth daily 90 tablet 3    montelukast (SINGULAIR) 10 MG tablet TAKE ONE TABLET BY MOUTH ONE TIME DAILY 90 tablet 3    MULTIVITAMIN ORAL [MULTIVITAMIN ORAL] Take 1 tablet by mouth daily.      PROAIR  (90 Base) MCG/ACT inhaler INHALE ONE OR TWO PUFFS BY MOUTH EVERY FOUR HOURS AS NEEDED 54 g 3    venlafaxine (EFFEXOR XR) 150 MG 24 hr capsule TAKE ONE CAPSULE BY MOUTH ONE TIME DAILY 90 capsule 0         Review of Systems   No fever.      Objective    /75   Pulse 75   Temp 98.2  F (36.8  C) (Oral)   Resp 20   Ht 1.74 m (5' 8.5\")   Wt 112.9 kg (249 lb)   SpO2 95%   BMI 37.31 kg/m    Body mass index is 37.31 kg/m .  Physical Exam   Heart normal  Lungs normal    A1c 7.2, had been 7.7                      "

## 2024-01-13 LAB
ALBUMIN SERPL BCG-MCNC: 4.6 G/DL (ref 3.5–5.2)
ALP SERPL-CCNC: 104 U/L (ref 40–150)
ALT SERPL W P-5'-P-CCNC: 24 U/L (ref 0–70)
ANION GAP SERPL CALCULATED.3IONS-SCNC: 9 MMOL/L (ref 7–15)
AST SERPL W P-5'-P-CCNC: 21 U/L (ref 0–45)
BILIRUB SERPL-MCNC: 0.5 MG/DL
BUN SERPL-MCNC: 15.5 MG/DL (ref 8–23)
CALCIUM SERPL-MCNC: 9.4 MG/DL (ref 8.8–10.2)
CHLORIDE SERPL-SCNC: 100 MMOL/L (ref 98–107)
CREAT SERPL-MCNC: 0.67 MG/DL (ref 0.67–1.17)
DEPRECATED HCO3 PLAS-SCNC: 31 MMOL/L (ref 22–29)
EGFRCR SERPLBLD CKD-EPI 2021: >90 ML/MIN/1.73M2
GLUCOSE SERPL-MCNC: 167 MG/DL (ref 70–99)
POTASSIUM SERPL-SCNC: 4.2 MMOL/L (ref 3.4–5.3)
PROT SERPL-MCNC: 7.6 G/DL (ref 6.4–8.3)
SODIUM SERPL-SCNC: 140 MMOL/L (ref 135–145)

## 2024-03-14 DIAGNOSIS — F01.53 VASCULAR DEMENTIA WITH DEPRESSED MOOD (H): ICD-10-CM

## 2024-03-14 DIAGNOSIS — Z76.0 ENCOUNTER FOR MEDICATION REFILL: ICD-10-CM

## 2024-03-14 RX ORDER — VENLAFAXINE HYDROCHLORIDE 150 MG/1
CAPSULE, EXTENDED RELEASE ORAL
Qty: 90 CAPSULE | Refills: 0 | Status: SHIPPED | OUTPATIENT
Start: 2024-03-14 | End: 2024-06-11

## 2024-03-18 DIAGNOSIS — E29.1 TESTICULAR HYPOFUNCTION: ICD-10-CM

## 2024-03-18 DIAGNOSIS — D35.2 PITUITARY ADENOMA (H): ICD-10-CM

## 2024-03-18 DIAGNOSIS — E22.1 HYPERPROLACTINEMIA (H): ICD-10-CM

## 2024-03-18 RX ORDER — CABERGOLINE 0.5 MG/1
TABLET ORAL
Qty: 8 TABLET | Refills: 11 | Status: SHIPPED | OUTPATIENT
Start: 2024-03-18

## 2024-04-28 DIAGNOSIS — E78.2 MIXED HYPERLIPIDEMIA: ICD-10-CM

## 2024-04-28 DIAGNOSIS — I10 ESSENTIAL HYPERTENSION WITH GOAL BLOOD PRESSURE LESS THAN 140/90: ICD-10-CM

## 2024-04-29 RX ORDER — AMLODIPINE BESYLATE 10 MG/1
10 TABLET ORAL DAILY
Qty: 90 TABLET | Refills: 0 | OUTPATIENT
Start: 2024-04-29

## 2024-04-29 RX ORDER — ATORVASTATIN CALCIUM 20 MG/1
20 TABLET, FILM COATED ORAL DAILY
Qty: 90 TABLET | Refills: 0 | OUTPATIENT
Start: 2024-04-29

## 2024-05-07 ENCOUNTER — TRANSFERRED RECORDS (OUTPATIENT)
Dept: MULTI SPECIALTY CLINIC | Facility: CLINIC | Age: 64
End: 2024-05-07
Payer: COMMERCIAL

## 2024-05-07 LAB — RETINOPATHY: NORMAL

## 2024-05-10 DIAGNOSIS — E78.2 MIXED HYPERLIPIDEMIA: ICD-10-CM

## 2024-05-10 DIAGNOSIS — I10 ESSENTIAL HYPERTENSION WITH GOAL BLOOD PRESSURE LESS THAN 140/90: ICD-10-CM

## 2024-05-12 ENCOUNTER — HEALTH MAINTENANCE LETTER (OUTPATIENT)
Age: 64
End: 2024-05-12

## 2024-05-13 RX ORDER — AMLODIPINE BESYLATE 10 MG/1
10 TABLET ORAL DAILY
Qty: 90 TABLET | Refills: 1 | Status: SHIPPED | OUTPATIENT
Start: 2024-05-13

## 2024-05-13 RX ORDER — ATORVASTATIN CALCIUM 20 MG/1
20 TABLET, FILM COATED ORAL DAILY
Qty: 90 TABLET | Refills: 1 | Status: SHIPPED | OUTPATIENT
Start: 2024-05-13

## 2024-05-14 ENCOUNTER — OFFICE VISIT (OUTPATIENT)
Dept: FAMILY MEDICINE | Facility: CLINIC | Age: 64
End: 2024-05-14
Attending: FAMILY MEDICINE
Payer: COMMERCIAL

## 2024-05-14 VITALS
HEIGHT: 69 IN | HEART RATE: 74 BPM | WEIGHT: 251.25 LBS | RESPIRATION RATE: 16 BRPM | OXYGEN SATURATION: 94 % | SYSTOLIC BLOOD PRESSURE: 134 MMHG | BODY MASS INDEX: 37.21 KG/M2 | TEMPERATURE: 98.1 F | DIASTOLIC BLOOD PRESSURE: 66 MMHG

## 2024-05-14 DIAGNOSIS — E11.9 TYPE 2 DIABETES MELLITUS WITHOUT COMPLICATION, WITHOUT LONG-TERM CURRENT USE OF INSULIN (H): Primary | ICD-10-CM

## 2024-05-14 DIAGNOSIS — Z12.11 SCREEN FOR COLON CANCER: ICD-10-CM

## 2024-05-14 DIAGNOSIS — D35.2 PITUITARY ADENOMA (H): ICD-10-CM

## 2024-05-14 DIAGNOSIS — Z23 NEED FOR VACCINATION: ICD-10-CM

## 2024-05-14 LAB
HBA1C MFR BLD: 6.7 % (ref 0–5.6)
HOLD SPECIMEN: NORMAL
PROLACTIN SERPL 3RD IS-MCNC: 6 NG/ML (ref 4–15)

## 2024-05-14 PROCEDURE — 84146 ASSAY OF PROLACTIN: CPT | Performed by: FAMILY MEDICINE

## 2024-05-14 PROCEDURE — 36415 COLL VENOUS BLD VENIPUNCTURE: CPT | Performed by: FAMILY MEDICINE

## 2024-05-14 PROCEDURE — 90677 PCV20 VACCINE IM: CPT | Performed by: FAMILY MEDICINE

## 2024-05-14 PROCEDURE — 83036 HEMOGLOBIN GLYCOSYLATED A1C: CPT | Performed by: FAMILY MEDICINE

## 2024-05-14 PROCEDURE — 99214 OFFICE O/P EST MOD 30 MIN: CPT | Mod: 25 | Performed by: FAMILY MEDICINE

## 2024-05-14 PROCEDURE — 90471 IMMUNIZATION ADMIN: CPT | Performed by: FAMILY MEDICINE

## 2024-05-14 ASSESSMENT — PATIENT HEALTH QUESTIONNAIRE - PHQ9
SUM OF ALL RESPONSES TO PHQ QUESTIONS 1-9: 2
10. IF YOU CHECKED OFF ANY PROBLEMS, HOW DIFFICULT HAVE THESE PROBLEMS MADE IT FOR YOU TO DO YOUR WORK, TAKE CARE OF THINGS AT HOME, OR GET ALONG WITH OTHER PEOPLE: NOT DIFFICULT AT ALL
SUM OF ALL RESPONSES TO PHQ QUESTIONS 1-9: 2

## 2024-05-14 NOTE — PROGRESS NOTES
"  Assessment & Plan     Type 2 diabetes mellitus without complication, without long-term current use of insulin (H)    Improved.  Good control.    Recheck in 4 months.      - PRIMARY CARE FOLLOW-UP SCHEDULING  - HEMOGLOBIN A1C  - HEMOGLOBIN A1C  - PRIMARY CARE FOLLOW-UP SCHEDULING    Pituitary adenoma (H)    Stable.    - Prolactin  - Prolactin    Need for vaccination    - Pneumococcal 20 Valent Conjugate (Prevnar 20)    Screen for colon cancer    - COLOGUARD([employer])    The longitudinal plan of care for the diagnosis(es)/condition(s) as documented were addressed during this visit. Due to the added complexity in care, I will continue to support Breezy in the subsequent management and with ongoing continuity of care.        38 minutes spent by me on the date of the encounter doing chart review, review of test results, and patient visit regarding DM, pituitary adenoma.        BMI  Estimated body mass index is 37.65 kg/m  as calculated from the following:    Height as of this encounter: 1.74 m (5' 8.5\").    Weight as of this encounter: 114 kg (251 lb 4 oz).             Subjective   Breezy is a 63 year old, presenting for the following health issues:  Diabetes and Blood Pressure Check        5/14/2024     7:11 AM   Additional Questions   Roomed by Roberta LAMB     Via the [employer] Maintenance questionnaire, the patient has reported the following services have been completed , this information has been sent to the abstraction team.  History of Present Illness       Diabetes:   He presents for follow up of diabetes.    He is not checking blood glucose.         He has no concerns regarding his diabetes at this time.   He is not experiencing numbness or burning in feet, excessive thirst, blurry vision, weight changes or redness, sores or blisters on feet. The patient has had a diabetic eye exam in the last 12 months. Eye exam performed on May 7, 2024. Location of last eye exam [facility].                  Eating " healthy.  Walking with second dog (pit/lab puppy)    Nocturia x 1-2  PSA 0.29 in 2021    Weight increased 2 # since January.    Current Outpatient Medications   Medication Sig Dispense Refill    amLODIPine (NORVASC) 10 MG tablet TAKE ONE TABLET BY MOUTH ONE TIME DAILY 90 tablet 1    atorvastatin (LIPITOR) 20 MG tablet TAKE ONE TABLET BY MOUTH ONE TIME DAILY 90 tablet 1    cabergoline (DOSTINEX) 0.5 MG tablet TAKE HALF TABLET BY MOUTH TWICE WEEKLY 8 tablet 11    cholecalciferol, vitamin D3, (VITAMIN D3) 2,000 unit cap [CHOLECALCIFEROL, VITAMIN D3, (VITAMIN D3) 2,000 UNIT CAP] Take 1 capsule by mouth daily.      dorzolamide-timolol (COSOPT) 22.3-6.8 mg/mL ophthalmic solution [DORZOLAMIDE-TIMOLOL (COSOPT) 22.3-6.8 MG/ML OPHTHALMIC SOLUTION]   10    EPINEPHrine (ANY BX GENERIC EQUIV) 0.3 MG/0.3ML injection 2-pack Inject 0.3 mLs (0.3 mg) into the muscle as needed for anaphylaxis 1 each 1    FLOVENT  MCG/ACT inhaler INHALE 1 PUFF BY MOUTH TWICE DAILY 12 g 3    hydrochlorothiazide (HYDRODIURIL) 12.5 MG tablet TAKE ONE TABLET BY MOUTH ONE TIME DAILY 90 tablet 3    latanoprost (XALATAN) 0.005 % ophthalmic solution [LATANOPROST (XALATAN) 0.005 % OPHTHALMIC SOLUTION]   10    lisinopril (ZESTRIL) 40 MG tablet TAKE ONE TABLET BY MOUTH ONE TIME DAILY 90 tablet 3    loratadine (CLARITIN) 10 mg tablet [LORATADINE (CLARITIN) 10 MG TABLET] Take 10 mg by mouth daily.      metFORMIN (GLUCOPHAGE XR) 500 MG 24 hr tablet Take 1 tablet (500 mg) by mouth daily 90 tablet 3    montelukast (SINGULAIR) 10 MG tablet TAKE ONE TABLET BY MOUTH ONE TIME DAILY 90 tablet 3    MULTIVITAMIN ORAL [MULTIVITAMIN ORAL] Take 1 tablet by mouth daily.      PROAIR  (90 Base) MCG/ACT inhaler INHALE ONE OR TWO PUFFS BY MOUTH EVERY FOUR HOURS AS NEEDED 54 g 3    venlafaxine (EFFEXOR XR) 150 MG 24 hr capsule TAKE ONE CAPSULE BY MOUTH ONE TIME DAILY 90 capsule 0             Objective    /66   Pulse 74   Temp 98.1  F (36.7  C) (Oral)   Resp 16   " Ht 1.74 m (5' 8.5\")   Wt 114 kg (251 lb 4 oz)   SpO2 94%   BMI 37.65 kg/m    Body mass index is 37.65 kg/m .  Physical Exam     Heart normal  Lungs normal  Feet: normal monofilament    A1c 6.7, had been 7.2          Signed Electronically by: Rajiv Figueroa MD    "

## 2024-05-23 ENCOUNTER — ORDERS ONLY (AUTO-RELEASED) (OUTPATIENT)
Dept: FAMILY MEDICINE | Facility: CLINIC | Age: 64
End: 2024-05-23
Payer: COMMERCIAL

## 2024-05-23 DIAGNOSIS — Z12.11 SCREEN FOR COLON CANCER: ICD-10-CM

## 2024-06-02 DIAGNOSIS — I10 ESSENTIAL HYPERTENSION WITH GOAL BLOOD PRESSURE LESS THAN 140/90: ICD-10-CM

## 2024-06-03 RX ORDER — LISINOPRIL 40 MG/1
TABLET ORAL
Qty: 90 TABLET | Refills: 1 | Status: SHIPPED | OUTPATIENT
Start: 2024-06-03

## 2024-06-11 DIAGNOSIS — F01.53 VASCULAR DEMENTIA WITH DEPRESSED MOOD (H): ICD-10-CM

## 2024-06-11 DIAGNOSIS — Z76.0 ENCOUNTER FOR MEDICATION REFILL: ICD-10-CM

## 2024-06-11 RX ORDER — VENLAFAXINE HYDROCHLORIDE 150 MG/1
CAPSULE, EXTENDED RELEASE ORAL
Qty: 90 CAPSULE | Refills: 0 | Status: SHIPPED | OUTPATIENT
Start: 2024-06-11 | End: 2024-09-08

## 2024-06-16 ENCOUNTER — MYC REFILL (OUTPATIENT)
Dept: FAMILY MEDICINE | Facility: CLINIC | Age: 64
End: 2024-06-16
Payer: COMMERCIAL

## 2024-06-16 DIAGNOSIS — Z76.0 ENCOUNTER FOR MEDICATION REFILL: ICD-10-CM

## 2024-06-16 DIAGNOSIS — J45.30 MILD PERSISTENT ASTHMA WITHOUT COMPLICATION: ICD-10-CM

## 2024-06-17 RX ORDER — ALBUTEROL SULFATE 90 UG/1
2 AEROSOL, METERED RESPIRATORY (INHALATION) EVERY 4 HOURS PRN
Qty: 54 G | Refills: 3 | Status: SHIPPED | OUTPATIENT
Start: 2024-06-17

## 2024-06-17 RX ORDER — FLUTICASONE PROPIONATE 110 UG/1
1 AEROSOL, METERED RESPIRATORY (INHALATION) 2 TIMES DAILY
Qty: 12 G | Refills: 11 | Status: SHIPPED | OUTPATIENT
Start: 2024-06-17 | End: 2024-06-20 | Stop reason: ALTCHOICE

## 2024-06-19 ENCOUNTER — TELEPHONE (OUTPATIENT)
Dept: FAMILY MEDICINE | Facility: CLINIC | Age: 64
End: 2024-06-19
Payer: COMMERCIAL

## 2024-06-19 DIAGNOSIS — J45.30 MILD PERSISTENT ASTHMA WITHOUT COMPLICATION: Primary | ICD-10-CM

## 2024-06-19 NOTE — TELEPHONE ENCOUNTER
General Call    Contacts       Contact Date/Time Type Contact Phone/Fax    06/19/2024 09:10 AM CDT Fax (Incoming) [id number] [place], [place] - [address] (Pharmacy) [phone]          Reason for Call:  Prescription - Alternative requested    What are your questions or concerns:  Generic fluticasone (FLOVENT HFA) 110 MCG/ACT inhaler not covered by insurance. Would prescriber like to switch to alternate medicine? If so, please discontinue fluticasone (FLOVENT HFA) 110 MCG/ACT inhaler and send new drug order.     Date of last appointment with provider: 05/14/2024

## 2024-06-20 NOTE — TELEPHONE ENCOUNTER
Retail Pharmacy Prior Authorization Team   Phone: [phone]    PA Initiation    Medication:   Insurance Company: [employer] Non-Specialty PA's - Phone [phone] Fax [phone]  Pharmacy Filling the Rx: [facility] [id number] - [place], [place] - [address]  Filling Pharmacy Phone: [phone]  Filling Pharmacy Fax: [phone]  Start Date: 6/20/2024

## 2024-06-20 NOTE — TELEPHONE ENCOUNTER
New rx sent    Mild persistent asthma without complication  -     fluticasone (ARNUITY ELLIPTA) 100 MCG/ACT inhaler; Inhale 1 puff into the lungs daily

## 2024-06-20 NOTE — TELEPHONE ENCOUNTER
PRIOR AUTHORIZATION DENIED    Medication: Fluticasone - DENIED    Denial Date: 6/20/2024    Denial Rational: INSURANCE STATES PATIENT MUST TRY/FAIL THREE FORMULARY ALTERNATIVES - ARNUITY ELLIPTA, ASMANES HFA/TWISHALER, QVAR REDIHALER.        Appeal Information:  IF PATIENT IS UNABLE TO TRY/FAIL ALTERNATIVE(S) PLEASE SUPPLY PA TEAM WITH A LETTER OF MEDICAL NECESSITY WITH CLINICAL REASON.

## 2024-06-22 DIAGNOSIS — I10 ESSENTIAL HYPERTENSION WITH GOAL BLOOD PRESSURE LESS THAN 140/90: ICD-10-CM

## 2024-06-24 RX ORDER — HYDROCHLOROTHIAZIDE 12.5 MG/1
TABLET ORAL
Qty: 90 TABLET | Refills: 2 | Status: SHIPPED | OUTPATIENT
Start: 2024-06-24

## 2024-07-06 DIAGNOSIS — J30.1 CHRONIC SEASONAL ALLERGIC RHINITIS DUE TO POLLEN: ICD-10-CM

## 2024-07-06 DIAGNOSIS — J45.30 MILD PERSISTENT ASTHMA WITHOUT COMPLICATION: ICD-10-CM

## 2024-07-06 RX ORDER — MONTELUKAST SODIUM 10 MG/1
TABLET ORAL
Qty: 90 TABLET | Refills: 2 | Status: SHIPPED | OUTPATIENT
Start: 2024-07-06

## 2024-09-02 DIAGNOSIS — E11.9 TYPE 2 DIABETES MELLITUS WITHOUT COMPLICATION, WITHOUT LONG-TERM CURRENT USE OF INSULIN (H): ICD-10-CM

## 2024-09-03 RX ORDER — METFORMIN HCL 500 MG
500 TABLET, EXTENDED RELEASE 24 HR ORAL DAILY
Qty: 90 TABLET | Refills: 0 | Status: SHIPPED | OUTPATIENT
Start: 2024-09-03

## 2024-09-07 DIAGNOSIS — F01.53 VASCULAR DEMENTIA WITH DEPRESSED MOOD (H): ICD-10-CM

## 2024-09-07 DIAGNOSIS — Z76.0 ENCOUNTER FOR MEDICATION REFILL: ICD-10-CM

## 2024-09-08 RX ORDER — VENLAFAXINE HYDROCHLORIDE 150 MG/1
CAPSULE, EXTENDED RELEASE ORAL
Qty: 90 CAPSULE | Refills: 0 | Status: SHIPPED | OUTPATIENT
Start: 2024-09-08

## 2024-09-29 ENCOUNTER — HEALTH MAINTENANCE LETTER (OUTPATIENT)
Age: 64
End: 2024-09-29

## 2024-10-07 ENCOUNTER — OFFICE VISIT (OUTPATIENT)
Dept: PEDIATRICS | Facility: CLINIC | Age: 64
End: 2024-10-07
Payer: COMMERCIAL

## 2024-10-07 ENCOUNTER — HOSPITAL ENCOUNTER (OUTPATIENT)
Dept: GENERAL RADIOLOGY | Facility: HOSPITAL | Age: 64
Discharge: HOME | End: 2024-10-07
Attending: FAMILY MEDICINE | Admitting: FAMILY MEDICINE
Payer: COMMERCIAL

## 2024-10-07 VITALS
SYSTOLIC BLOOD PRESSURE: 108 MMHG | HEART RATE: 76 BPM | WEIGHT: 247.9 LBS | TEMPERATURE: 98.5 F | DIASTOLIC BLOOD PRESSURE: 70 MMHG | RESPIRATION RATE: 18 BRPM | BODY MASS INDEX: 37.14 KG/M2 | OXYGEN SATURATION: 96 %

## 2024-10-07 DIAGNOSIS — M25.552 HIP PAIN, LEFT: ICD-10-CM

## 2024-10-07 DIAGNOSIS — R10.9 LEFT FLANK PAIN: ICD-10-CM
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
## 2024-10-07 LAB
ALBUMIN UR-MCNC: ABNORMAL MG/DL
APPEARANCE UR: CLEAR
BACTERIA #/AREA URNS HPF: ABNORMAL /HPF
BILIRUB UR QL STRIP: NEGATIVE
COLOR UR AUTO: YELLOW
GLUCOSE UR STRIP-MCNC: NEGATIVE MG/DL
HGB UR QL STRIP: NEGATIVE
KETONES UR STRIP-MCNC: NEGATIVE MG/DL
LEUKOCYTE ESTERASE UR QL STRIP: NEGATIVE
NITRATE UR QL: NEGATIVE
PH UR STRIP: 6.5 [PH] (ref 5–8)
RBC #/AREA URNS AUTO: ABNORMAL /HPF
SP GR UR STRIP: 1.02 (ref 1–1.03)
SQUAMOUS #/AREA URNS AUTO: ABNORMAL /LPF
UROBILINOGEN UR STRIP-ACNC: 0.2 E.U./DL
WBC #/AREA URNS AUTO: ABNORMAL /HPF

## 2024-10-07 PROCEDURE — 81001 URINALYSIS AUTO W/SCOPE: CPT | Performed by: FAMILY MEDICINE

## 2024-10-07 PROCEDURE — 99214 OFFICE O/P EST MOD 30 MIN: CPT | Performed by: FAMILY MEDICINE

## 2024-10-07 PROCEDURE — 73502 X-RAY EXAM HIP UNI 2-3 VIEWS: CPT

## 2024-10-07 ASSESSMENT — PAIN SCALES - GENERAL: PAINLEVEL: MILD PAIN (3)

## 2024-10-07 NOTE — PATIENT INSTRUCTIONS
Relative Rest  - avoid activities that cause pain.  Ice as needed (especially the first 3 days)  Can also try heat as needed.    Aleve 2 tablets twice daily as needed     Please follow up if you develop new symptoms, worsening pain, blood in the urine or any other worrisome symptom.

## 2024-10-07 NOTE — PROGRESS NOTES
"Acute and Diagnostic Services Clinic Visit    Assessment & Plan     Fall from ladder, initial encounter  Contusion of the left flank from the fall  Recommended:  Relative Rest  - avoid activities that cause pain.  Ice as needed (especially the first 3 days)  Can also try heat as needed.  Aleve 2 tablets twice daily as needed   Please follow up if you develop new symptoms, worsening pain, blood in the urine or any other worrisome symptom.  Patient verbalized understanding  - XR Hip Left 2-3 Views; Future  - UA Macroscopic with reflex to Microscopic and Culture - Clinic Collect  - UA Microscopic with Reflex to Culture    Hip pain, left  See above  - XR Hip Left 2-3 Views; Future    Left flank pain  See above  - XR Hip Left 2-3 Views; Future  - UA Macroscopic with reflex to Microscopic and Culture - Clinic Collect  - UA Microscopic with Reflex to Culture          BMI  Estimated body mass index is 37.14 kg/m  as calculated from the following:    Height as of 5/14/24: 1.74 m (5' 8.5\").    Weight as of this encounter: 112.4 kg (247 lb 14.4 oz).             No follow-ups on file.    Subjective   Breezy is a 64 year old, presenting for the following health issues:  Fall (Left side, lower hip pain)    HPI     Back Pain   Duration: Noon today        Specific cause: fall   Description:   Location of pain: hip left  Character of pain: sharp, dull ache, burning, and intermittent  Pain radiation:none  New numbness or weakness in legs, not attributed to pain:  not that patient is aware  Intensity: Currently 3/10 - moving around 9-10  History:   Pain interferes with job: YES  History of back problems: Sciatic on and off both legs, at different times  Any previous MRI or X-rays: Yes--at [facility]. Endo, MRI done.  Date unknown   Sees a specialist for back pain:  No  Therapies tried without relief: none  Alleviating factors:   Improved by: siting still - dull pain  Precipitating factors:  Worsened by: twisting or moving in and " out of car is a bad pain      Patient is a 64-year-old male who presents to clinic after falling off a ladder about an hour ago.  At about noon today patient was on a ladder working in his garden.  He has raised garden beds which are about 2 feet up and then he has tomato plants that have grown about 8 feet.  He had the latter at the edge of the raised garden bed and he was going to step onto the edge of the garden bed to get some of the tomatoes.  He was thinking about other things and is not sure if he slipped or if he missed the edge of the raised garden bed but in either case he fell.  He heard a cracking noise when he fell and is not sure if that was his body or the wood.  He hit the edge of the raised garden bed at his left flank.  His legs were caught in the ladder and his wife came out to help him get untangled and help him get up.  He walked into the house on his own power.  He did not hit his head or lose consciousness.  We did a body scan and besides some neck pain which he had in the morning after sleeping wrong and some tension in his shoulders which is chronic he had no other pain.    Review of Systems  Negative except as listed in HPI      Objective    BP (!) 146/76 (BP Location: Right arm, Patient Position: Sitting, Cuff Size: Adult Large)   Pulse 72   Temp 98.5  F (36.9  C) (Oral)   Resp 18   Wt 112.4 kg (247 lb 14.4 oz)   SpO2 95%   BMI 37.14 kg/m    Body mass index is 37.14 kg/m .  Physical Exam   Vitals noted and within normal limits with repeat blood pressure.  Initial blood pressure was a little bit high but patient was also in pain.  In general he is alert, oriented, and in no acute distress  Breathing is not labored  There is no tenderness to palpation of his thoracic or lumbar spine  There is tenderness to palpation of his low left flank and there is a horizontally oriented linear aspect of dry looking skin which is likely a scrape from the fall.  This is the area of tenderness.  There  is no current erythema or ecchymoses in this area.  There is some pain at the left pelvis superiorly.  No tenderness at the SI joints  Mild tenderness to rotation of the left hip.  No tenderness to the rotation of the right hip.  No tenderness of the left femur, knee, lower leg or foot  Bilateral posterior tibial pulses are normal and equal bilaterally  Left hip x-ray and pelvic x-ray within normal limits with no fractures  UA with no blood  Results for orders placed or performed during the hospital encounter of 10/07/24   XR Pelvis w Hip Left G/E 2 Views     Status: None    Narrative    EXAM: XR PELVIS AND HIP LEFT 2 VIEWS  LOCATION: [facility]  DATE: 10/7/2024    INDICATION: Fall from ladder, initial encounter, Hip pain, left, Left flank pain  COMPARISON: None.      Impression    IMPRESSION: No acute fracture or alignment. Mild degenerative changes throughout the pelvis and lower lumbar spine. Bony spurring at the superior left acetabulum.   Results for orders placed or performed in visit on 10/07/24   UA Macroscopic with reflex to Microscopic and Culture - Clinic Collect     Status: Abnormal    Specimen: Urine, Clean Catch   Result Value Ref Range    Color Urine Yellow Colorless, Straw, Light Yellow, Yellow    Appearance Urine Clear Clear    Glucose Urine Negative Negative mg/dL    Bilirubin Urine Negative Negative    Ketones Urine Negative Negative mg/dL    Specific Gravity Urine 1.020 1.005 - 1.030    Blood Urine Negative Negative    pH Urine 6.5 5.0 - 8.0    Protein Albumin Urine Trace (A) Negative mg/dL    Urobilinogen Urine 0.2 0.2, 1.0 E.U./dL    Nitrite Urine Negative Negative    Leukocyte Esterase Urine Negative Negative   UA Microscopic with Reflex to Culture     Status: Abnormal   Result Value Ref Range    Bacteria Urine Few (A) None Seen /HPF    RBC Urine 0-2 0-2 /HPF /HPF    WBC Urine 0-5 0-5 /HPF /HPF    Squamous Epithelials Urine Few (A) None Seen /LPF    Narrative    Urine  Culture not indicated                 Signed Electronically by: Paulette Quintanilla MD

## 2024-11-02 DIAGNOSIS — I10 ESSENTIAL HYPERTENSION WITH GOAL BLOOD PRESSURE LESS THAN 140/90: ICD-10-CM

## 2024-11-02 DIAGNOSIS — E78.2 MIXED HYPERLIPIDEMIA: ICD-10-CM

## 2024-11-02 RX ORDER — AMLODIPINE BESYLATE 10 MG/1
10 TABLET ORAL DAILY
Qty: 90 TABLET | Refills: 1 | Status: SHIPPED | OUTPATIENT
Start: 2024-11-02

## 2024-11-03 RX ORDER — ATORVASTATIN CALCIUM 20 MG/1
20 TABLET, FILM COATED ORAL DAILY
Qty: 90 TABLET | Refills: 3 | Status: SHIPPED | OUTPATIENT
Start: 2024-11-03

## 2024-11-14 DIAGNOSIS — I10 ESSENTIAL HYPERTENSION WITH GOAL BLOOD PRESSURE LESS THAN 140/90: ICD-10-CM

## 2024-11-14 RX ORDER — LISINOPRIL 40 MG/1
TABLET ORAL
Qty: 90 TABLET | Refills: 0 | Status: SHIPPED | OUTPATIENT
Start: 2024-11-14

## 2024-11-19 ASSESSMENT — ASTHMA QUESTIONNAIRES
QUESTION_2 LAST FOUR WEEKS HOW OFTEN HAVE YOU HAD SHORTNESS OF BREATH: ONCE A DAY
QUESTION_4 LAST FOUR WEEKS HOW OFTEN HAVE YOU USED YOUR RESCUE INHALER OR NEBULIZER MEDICATION (SUCH AS ALBUTEROL): ONCE A WEEK OR LESS
QUESTION_1 LAST FOUR WEEKS HOW MUCH OF THE TIME DID YOUR ASTHMA KEEP YOU FROM GETTING AS MUCH DONE AT WORK, SCHOOL OR AT HOME: NONE OF THE TIME
QUESTION_3 LAST FOUR WEEKS HOW OFTEN DID YOUR ASTHMA SYMPTOMS (WHEEZING, COUGHING, SHORTNESS OF BREATH, CHEST TIGHTNESS OR PAIN) WAKE YOU UP AT NIGHT OR EARLIER THAN USUAL IN THE MORNING: NOT AT ALL
ACT_TOTALSCORE: 20
ACT_TOTALSCORE: 20
QUESTION_5 LAST FOUR WEEKS HOW WOULD YOU RATE YOUR ASTHMA CONTROL: WELL CONTROLLED

## 2024-11-20 ENCOUNTER — OFFICE VISIT (OUTPATIENT)
Dept: FAMILY MEDICINE | Facility: CLINIC | Age: 64
End: 2024-11-20
Payer: COMMERCIAL

## 2024-11-20 VITALS
BODY MASS INDEX: 38.31 KG/M2 | HEART RATE: 76 BPM | HEIGHT: 68 IN | WEIGHT: 252.8 LBS | SYSTOLIC BLOOD PRESSURE: 138 MMHG | TEMPERATURE: 98.1 F | DIASTOLIC BLOOD PRESSURE: 74 MMHG | OXYGEN SATURATION: 94 % | RESPIRATION RATE: 16 BRPM

## 2024-11-20 DIAGNOSIS — Z23 NEED FOR VACCINATION: ICD-10-CM

## 2024-11-20 DIAGNOSIS — E11.9 TYPE 2 DIABETES MELLITUS WITHOUT COMPLICATION, WITHOUT LONG-TERM CURRENT USE OF INSULIN (H): Primary | ICD-10-CM

## 2024-11-20 DIAGNOSIS — E78.2 MIXED HYPERLIPIDEMIA: ICD-10-CM

## 2024-11-20 LAB
ALBUMIN SERPL BCG-MCNC: 4.5 G/DL (ref 3.5–5.2)
ALP SERPL-CCNC: 110 U/L (ref 40–150)
ALT SERPL W P-5'-P-CCNC: 21 U/L (ref 0–70)
ANION GAP SERPL CALCULATED.3IONS-SCNC: 11 MMOL/L (ref 7–15)
AST SERPL W P-5'-P-CCNC: 22 U/L (ref 0–45)
BILIRUB SERPL-MCNC: 0.6 MG/DL
BUN SERPL-MCNC: 11.8 MG/DL (ref 8–23)
CALCIUM SERPL-MCNC: 9.5 MG/DL (ref 8.8–10.4)
CHLORIDE SERPL-SCNC: 99 MMOL/L (ref 98–107)
CHOLEST SERPL-MCNC: 170 MG/DL
CREAT SERPL-MCNC: 0.56 MG/DL (ref 0.67–1.17)
CREAT UR-MCNC: 118 MG/DL
EGFRCR SERPLBLD CKD-EPI 2021: >90 ML/MIN/1.73M2
EST. AVERAGE GLUCOSE BLD GHB EST-MCNC: 163 MG/DL
FASTING STATUS PATIENT QL REPORTED: YES
FASTING STATUS PATIENT QL REPORTED: YES
GLUCOSE SERPL-MCNC: 192 MG/DL (ref 70–99)
HBA1C MFR BLD: 7.3 % (ref 0–5.6)
HCO3 SERPL-SCNC: 27 MMOL/L (ref 22–29)
HDLC SERPL-MCNC: 63 MG/DL
LDLC SERPL CALC-MCNC: 78 MG/DL
MICROALBUMIN UR-MCNC: 27.6 MG/L
MICROALBUMIN/CREAT UR: 23.39 MG/G CR (ref 0–17)
NONHDLC SERPL-MCNC: 107 MG/DL
POTASSIUM SERPL-SCNC: 4.2 MMOL/L (ref 3.4–5.3)
PROT SERPL-MCNC: 7.5 G/DL (ref 6.4–8.3)
SODIUM SERPL-SCNC: 137 MMOL/L (ref 135–145)
TRIGL SERPL-MCNC: 143 MG/DL

## 2024-11-20 PROCEDURE — 99214 OFFICE O/P EST MOD 30 MIN: CPT | Mod: 25 | Performed by: FAMILY MEDICINE

## 2024-11-20 PROCEDURE — 83036 HEMOGLOBIN GLYCOSYLATED A1C: CPT | Performed by: FAMILY MEDICINE

## 2024-11-20 PROCEDURE — 80053 COMPREHEN METABOLIC PANEL: CPT | Performed by: FAMILY MEDICINE

## 2024-11-20 PROCEDURE — 80061 LIPID PANEL: CPT | Performed by: FAMILY MEDICINE

## 2024-11-20 PROCEDURE — 91320 SARSCV2 VAC 30MCG TRS-SUC IM: CPT | Performed by: FAMILY MEDICINE

## 2024-11-20 PROCEDURE — 82570 ASSAY OF URINE CREATININE: CPT | Performed by: FAMILY MEDICINE

## 2024-11-20 PROCEDURE — 90480 ADMN SARSCOV2 VAC 1/ONLY CMP: CPT | Performed by: FAMILY MEDICINE

## 2024-11-20 PROCEDURE — 82043 UR ALBUMIN QUANTITATIVE: CPT | Performed by: FAMILY MEDICINE

## 2024-11-20 PROCEDURE — 90471 IMMUNIZATION ADMIN: CPT | Performed by: FAMILY MEDICINE

## 2024-11-20 PROCEDURE — 90673 RIV3 VACCINE NO PRESERV IM: CPT | Performed by: FAMILY MEDICINE

## 2024-11-20 PROCEDURE — 36415 COLL VENOUS BLD VENIPUNCTURE: CPT | Performed by: FAMILY MEDICINE

## 2024-11-20 RX ORDER — METFORMIN HYDROCHLORIDE 500 MG/1
1000 TABLET, EXTENDED RELEASE ORAL DAILY
Qty: 180 TABLET | Refills: 3 | Status: SHIPPED | OUTPATIENT
Start: 2024-11-20

## 2024-11-20 NOTE — PROGRESS NOTES
"  {PROVIDER CHARTING PREFERENCE:305054}    Daniel Tijerina is a 64 year old, presenting for the following health issues:  Diabetes      11/20/2024     7:48 AM   Additional Questions   Roomed by rashida combs     History of Present Illness       Hypertension: He presents for follow up of hypertension.  He does not check blood pressure  regularly outside of the clinic. Outside blood pressures have been over 140/90. He follows a low salt diet.     He eats 4 or more servings of fruits and vegetables daily.He consumes 0 sweetened beverage(s) daily.He exercises with enough effort to increase his heart rate 30 to 60 minutes per day.  He exercises with enough effort to increase his heart rate 4 days per week.   He is taking medications regularly.       {MA/LPN/RN Pre-Provider Visit Orders- hCG/UA/Strep (Optional):524638}  {SUPERLIST (Optional):359880}  {additonal problems for provider to add (Optional):158163}    {ROS Picklists (Optional):852114}      Objective    BP (!) 158/82   Pulse 76   Temp 98.1  F (36.7  C) (Oral)   Resp 16   Ht 1.739 m (5' 8.47\")   Wt 114.7 kg (252 lb 12.8 oz)   SpO2 94%   BMI 37.92 kg/m    Body mass index is 37.92 kg/m .  Physical Exam   {Exam List (Optional):540750}    {Diagnostic Test Results (Optional):604785}        Signed Electronically by: Rajiv Figueroa MD, MD  {Email feedback regarding this note to [email or web address]   :[id number]}  " day.  He exercises with enough effort to increase his heart rate 4 days per week.   He is taking medications regularly.             Has been taking metformin 1 per day.    Healing, after fall from raised garden bed (2 feet) and adjacent ladder on 10/7/24.    Less physical activity since then.    Current Outpatient Medications   Medication Sig Dispense Refill    albuterol (PROAIR HFA) 108 (90 Base) MCG/ACT inhaler Inhale 2 puffs into the lungs every 4 hours as needed for shortness of breath, wheezing or cough 54 g 3    amLODIPine (NORVASC) 10 MG tablet TAKE ONE TABLET BY MOUTH ONE TIME DAILY 90 tablet 1    atorvastatin (LIPITOR) 20 MG tablet TAKE ONE TABLET BY MOUTH ONE TIME DAILY 90 tablet 3    cabergoline (DOSTINEX) 0.5 MG tablet TAKE HALF TABLET BY MOUTH TWICE WEEKLY 8 tablet 11    cholecalciferol, vitamin D3, (VITAMIN D3) 2,000 unit cap [CHOLECALCIFEROL, VITAMIN D3, (VITAMIN D3) 2,000 UNIT CAP] Take 1 capsule by mouth daily.      dorzolamide-timolol (COSOPT) 22.3-6.8 mg/mL ophthalmic solution [DORZOLAMIDE-TIMOLOL (COSOPT) 22.3-6.8 MG/ML OPHTHALMIC SOLUTION]   10    EPINEPHrine (ANY BX GENERIC EQUIV) 0.3 MG/0.3ML injection 2-pack Inject 0.3 mLs (0.3 mg) into the muscle as needed for anaphylaxis 1 each 1    fluticasone (ARNUITY ELLIPTA) 100 MCG/ACT inhaler Inhale 1 puff into the lungs daily 1 each 11    hydroCHLOROthiazide 12.5 MG tablet TAKE ONE TABLET BY MOUTH ONE TIME DAILY 90 tablet 2    latanoprost (XALATAN) 0.005 % ophthalmic solution [LATANOPROST (XALATAN) 0.005 % OPHTHALMIC SOLUTION]   10    lisinopril (ZESTRIL) 40 MG tablet TAKE ONE TABLET BY MOUTH ONE TIME DAILY 90 tablet 0    loratadine (CLARITIN) 10 mg tablet [LORATADINE (CLARITIN) 10 MG TABLET] Take 10 mg by mouth daily.      metFORMIN (GLUCOPHAGE XR) 500 MG 24 hr tablet Take 2 tablets (1,000 mg) by mouth daily. 180 tablet 3    montelukast (SINGULAIR) 10 MG tablet TAKE ONE TABLET BY MOUTH ONE TIME DAILY 90 tablet 2    MULTIVITAMIN ORAL [MULTIVITAMIN  "ORAL] Take 1 tablet by mouth daily.      venlafaxine (EFFEXOR XR) 150 MG 24 hr capsule TAKE ONE CAPSULE BY MOUTH ONE TIME DAILY 90 capsule 0           Objective    /74   Pulse 76   Temp 98.1  F (36.7  C) (Oral)   Resp 16   Ht 1.739 m (5' 8.47\")   Wt 114.7 kg (252 lb 12.8 oz)   SpO2 94%   BMI 37.92 kg/m    Body mass index is 37.92 kg/m .  Physical Exam     Heart normal  Lungs normal    A1c 7.3, had been 6.7          Signed Electronically by: Rajiv Figueroa MD    "

## 2025-03-02 DIAGNOSIS — F01.53 VASCULAR DEMENTIA WITH DEPRESSED MOOD (H): ICD-10-CM

## 2025-03-02 DIAGNOSIS — Z76.0 ENCOUNTER FOR MEDICATION REFILL: ICD-10-CM

## 2025-03-02 RX ORDER — VENLAFAXINE HYDROCHLORIDE 150 MG/1
CAPSULE, EXTENDED RELEASE ORAL
Qty: 90 CAPSULE | Refills: 0 | Status: SHIPPED | OUTPATIENT
Start: 2025-03-02

## 2025-03-15 ENCOUNTER — HEALTH MAINTENANCE LETTER (OUTPATIENT)
Age: 65
End: 2025-03-15

## 2025-03-18 DIAGNOSIS — J45.30 MILD PERSISTENT ASTHMA WITHOUT COMPLICATION: ICD-10-CM

## 2025-03-18 DIAGNOSIS — J30.1 CHRONIC SEASONAL ALLERGIC RHINITIS DUE TO POLLEN: ICD-10-CM

## 2025-03-18 RX ORDER — MONTELUKAST SODIUM 10 MG/1
TABLET ORAL
Qty: 90 TABLET | Refills: 0 | Status: SHIPPED | OUTPATIENT
Start: 2025-03-18

## 2025-03-25 ENCOUNTER — OFFICE VISIT (OUTPATIENT)
Dept: FAMILY MEDICINE | Facility: CLINIC | Age: 65
End: 2025-03-25
Attending: FAMILY MEDICINE
Payer: COMMERCIAL

## 2025-03-25 VITALS
WEIGHT: 253.08 LBS | HEART RATE: 74 BPM | SYSTOLIC BLOOD PRESSURE: 130 MMHG | TEMPERATURE: 97.2 F | RESPIRATION RATE: 18 BRPM | BODY MASS INDEX: 38.36 KG/M2 | HEIGHT: 68 IN | DIASTOLIC BLOOD PRESSURE: 74 MMHG | OXYGEN SATURATION: 94 %

## 2025-03-25 DIAGNOSIS — E11.9 TYPE 2 DIABETES MELLITUS WITHOUT COMPLICATION, WITHOUT LONG-TERM CURRENT USE OF INSULIN (H): Primary | ICD-10-CM

## 2025-03-25 DIAGNOSIS — M25.531 RIGHT WRIST PAIN: ICD-10-CM

## 2025-03-25 DIAGNOSIS — D35.2 PITUITARY ADENOMA (H): ICD-10-CM

## 2025-03-25 DIAGNOSIS — F33.1 MAJOR DEPRESSIVE DISORDER, RECURRENT EPISODE, MODERATE (H): ICD-10-CM

## 2025-03-25 DIAGNOSIS — M25.50 ARTHRALGIA, UNSPECIFIED JOINT: ICD-10-CM

## 2025-03-25 DIAGNOSIS — Z23 NEED FOR VACCINATION: ICD-10-CM

## 2025-03-25 DIAGNOSIS — E22.1 HYPERPROLACTINEMIA: ICD-10-CM

## 2025-03-25 DIAGNOSIS — E66.01 MORBID OBESITY (H): ICD-10-CM

## 2025-03-25 LAB
CRP SERPL-MCNC: 3.71 MG/L
ERYTHROCYTE [DISTWIDTH] IN BLOOD BY AUTOMATED COUNT: 13.2 % (ref 10–15)
ERYTHROCYTE [SEDIMENTATION RATE] IN BLOOD BY WESTERGREN METHOD: 16 MM/HR (ref 0–20)
EST. AVERAGE GLUCOSE BLD GHB EST-MCNC: 174 MG/DL
HBA1C MFR BLD: 7.7 % (ref 0–5.6)
HCT VFR BLD AUTO: 44.2 % (ref 40–53)
HGB BLD-MCNC: 15.6 G/DL (ref 13.3–17.7)
MCH RBC QN AUTO: 29.5 PG (ref 26.5–33)
MCHC RBC AUTO-ENTMCNC: 35.3 G/DL (ref 31.5–36.5)
MCV RBC AUTO: 84 FL (ref 78–100)
PLATELET # BLD AUTO: 178 10E3/UL (ref 150–450)
PROLACTIN SERPL 3RD IS-MCNC: 7 NG/ML (ref 4–15)
RBC # BLD AUTO: 5.29 10E6/UL (ref 4.4–5.9)
RHEUMATOID FACT SERPL-ACNC: <10 IU/ML
WBC # BLD AUTO: 5.9 10E3/UL (ref 4–11)

## 2025-03-25 PROCEDURE — 85027 COMPLETE CBC AUTOMATED: CPT | Performed by: FAMILY MEDICINE

## 2025-03-25 PROCEDURE — 85652 RBC SED RATE AUTOMATED: CPT | Performed by: FAMILY MEDICINE

## 2025-03-25 PROCEDURE — 86038 ANTINUCLEAR ANTIBODIES: CPT | Performed by: FAMILY MEDICINE

## 2025-03-25 PROCEDURE — 99214 OFFICE O/P EST MOD 30 MIN: CPT | Mod: 25 | Performed by: FAMILY MEDICINE

## 2025-03-25 PROCEDURE — 3075F SYST BP GE 130 - 139MM HG: CPT | Performed by: FAMILY MEDICINE

## 2025-03-25 PROCEDURE — 86431 RHEUMATOID FACTOR QUANT: CPT | Performed by: FAMILY MEDICINE

## 2025-03-25 PROCEDURE — 84146 ASSAY OF PROLACTIN: CPT | Performed by: FAMILY MEDICINE

## 2025-03-25 PROCEDURE — G2211 COMPLEX E/M VISIT ADD ON: HCPCS | Performed by: FAMILY MEDICINE

## 2025-03-25 PROCEDURE — 86140 C-REACTIVE PROTEIN: CPT | Performed by: FAMILY MEDICINE

## 2025-03-25 PROCEDURE — 36415 COLL VENOUS BLD VENIPUNCTURE: CPT | Performed by: FAMILY MEDICINE

## 2025-03-25 PROCEDURE — 90750 HZV VACC RECOMBINANT IM: CPT | Performed by: FAMILY MEDICINE

## 2025-03-25 PROCEDURE — 3078F DIAST BP <80 MM HG: CPT | Performed by: FAMILY MEDICINE

## 2025-03-25 PROCEDURE — 83036 HEMOGLOBIN GLYCOSYLATED A1C: CPT | Performed by: FAMILY MEDICINE

## 2025-03-25 PROCEDURE — 90471 IMMUNIZATION ADMIN: CPT | Performed by: FAMILY MEDICINE

## 2025-03-25 NOTE — PROGRESS NOTES
"  {PROVIDER CHARTING PREFERENCE:889623}    Daniel Tijerina is a 64 year old, presenting for the following health issues:  Diabetes      3/25/2025     7:46 AM   Additional Questions   Roomed by ONEAL KILGORE MA   Accompanied by SELF     History of Present Illness       Diabetes:   He presents for follow up of diabetes.    He is not checking blood glucose.         He has no concerns regarding his diabetes at this time.   He is not experiencing numbness or burning in feet, excessive thirst, blurry vision, weight changes or redness, sores or blisters on feet.                {MA/LPN/RN Pre-Provider Visit Orders- hCG/UA/Strep (Optional):554590}  {SUPERLIST (Optional):142089}  {additonal problems for provider to add (Optional):602202}    {ROS Picklists (Optional):114396}      Objective    Ht 1.739 m (5' 8.47\")   BMI 37.91 kg/m    Body mass index is 37.91 kg/m .  Physical Exam   {Exam List (Optional):373362}    {Diagnostic Test Results (Optional):520016}        Signed Electronically by: Rajiv Figueroa MD, MD  {Email feedback regarding this note to [email or web address]   :513717}      Prior to immunization administration, verified patients identity using patient s name and date of birth. Please see Immunization Activity for additional information.     Screening Questionnaire for Adult Immunization    Are you sick today?   No   Do you have allergies to medications, food, a vaccine component or latex?   No   Have you ever had a serious reaction after receiving a vaccination?   No   Do you have a long-term health problem with heart, lung, kidney, or metabolic disease (e.g., diabetes), asthma, a blood disorder, no spleen, complement component deficiency, a cochlear implant, or a spinal fluid leak?  Are you on long-term aspirin therapy?   No   Do you have cancer, leukemia, HIV/AIDS, or any other immune system problem?   No   Do you have a parent, brother, or sister with an immune system problem?   No   In " the past 3 months, have you taken medications that affect  your immune system, such as prednisone, other steroids, or anticancer drugs; drugs for the treatment of rheumatoid arthritis, Crohn s disease, or psoriasis; or have you had radiation treatments?   No   Have you had a seizure, or a brain or other nervous system problem?   No   During the past year, have you received a transfusion of blood or blood    products, or been given immune (gamma) globulin or antiviral drug?   No   For women: Are you pregnant or is there a chance you could become       pregnant during the next month?   No   Have you received any vaccinations in the past 4 weeks?   No     Immunization questionnaire answers were all negative.      Patient instructed to remain in clinic for 15 minutes afterwards, and to report any adverse reactions.     Screening performed by Sally Gregory MA on 3/25/2025 at 8:32 AM.    "CAPSULE BY MOUTH ONE TIME DAILY 90 capsule 0                   Objective    Ht 1.739 m (5' 8.47\")   BMI 37.91 kg/m    Body mass index is 37.91 kg/m .  Physical Exam     Heart normal  Lungs normal  Tinel's and Phalen's negative bilateral          Signed Electronically by: Rajiv Figueroa MD        Prior to immunization administration, verified patients identity using patient s name and date of birth. Please see Immunization Activity for additional information.     Screening Questionnaire for Adult Immunization    Are you sick today?   No   Do you have allergies to medications, food, a vaccine component or latex?   No   Have you ever had a serious reaction after receiving a vaccination?   No   Do you have a long-term health problem with heart, lung, kidney, or metabolic disease (e.g., diabetes), asthma, a blood disorder, no spleen, complement component deficiency, a cochlear implant, or a spinal fluid leak?  Are you on long-term aspirin therapy?   No   Do you have cancer, leukemia, HIV/AIDS, or any other immune system problem?   No   Do you have a parent, brother, or sister with an immune system problem?   No   In the past 3 months, have you taken medications that affect  your immune system, such as prednisone, other steroids, or anticancer drugs; drugs for the treatment of rheumatoid arthritis, Crohn s disease, or psoriasis; or have you had radiation treatments?   No   Have you had a seizure, or a brain or other nervous system problem?   No   During the past year, have you received a transfusion of blood or blood    products, or been given immune (gamma) globulin or antiviral drug?   No   For women: Are you pregnant or is there a chance you could become       pregnant during the next month?   No   Have you received any vaccinations in the past 4 weeks?   No     Immunization questionnaire answers were all negative.      Patient instructed to remain in clinic for 15 minutes afterwards, and to report any adverse " reactions.     Screening performed by Sally Gregory MA on 3/25/2025 at 8:32 AM.

## 2025-03-26 LAB — ANA SER QL IF: NEGATIVE

## 2025-04-20 DIAGNOSIS — I10 ESSENTIAL HYPERTENSION WITH GOAL BLOOD PRESSURE LESS THAN 140/90: ICD-10-CM

## 2025-04-21 RX ORDER — AMLODIPINE BESYLATE 10 MG/1
10 TABLET ORAL DAILY
Qty: 90 TABLET | Refills: 2 | Status: SHIPPED | OUTPATIENT
Start: 2025-04-21

## 2025-05-14 DIAGNOSIS — I10 ESSENTIAL HYPERTENSION WITH GOAL BLOOD PRESSURE LESS THAN 140/90: ICD-10-CM

## 2025-05-14 RX ORDER — LISINOPRIL 40 MG/1
40 TABLET ORAL
Qty: 90 TABLET | Refills: 2 | Status: SHIPPED | OUTPATIENT
Start: 2025-05-14

## 2025-05-19 ENCOUNTER — PATIENT OUTREACH (OUTPATIENT)
Dept: CARE COORDINATION | Facility: CLINIC | Age: 65
End: 2025-05-19
Payer: COMMERCIAL

## 2025-05-29 DIAGNOSIS — F01.53 VASCULAR DEMENTIA WITH DEPRESSED MOOD (H): ICD-10-CM

## 2025-05-29 DIAGNOSIS — Z76.0 ENCOUNTER FOR MEDICATION REFILL: ICD-10-CM

## 2025-05-29 RX ORDER — VENLAFAXINE HYDROCHLORIDE 150 MG/1
150 CAPSULE, EXTENDED RELEASE ORAL DAILY
Qty: 90 CAPSULE | Refills: 3 | Status: SHIPPED | OUTPATIENT
Start: 2025-05-29

## 2025-06-03 DIAGNOSIS — I10 ESSENTIAL HYPERTENSION WITH GOAL BLOOD PRESSURE LESS THAN 140/90: ICD-10-CM

## 2025-06-03 RX ORDER — HYDROCHLOROTHIAZIDE 12.5 MG/1
12.5 TABLET ORAL
Qty: 60 TABLET | Refills: 0 | Status: SHIPPED | OUTPATIENT
Start: 2025-06-03

## 2025-06-29 ENCOUNTER — HEALTH MAINTENANCE LETTER (OUTPATIENT)
Age: 65
End: 2025-06-29

## 2025-07-28 ASSESSMENT — ASTHMA QUESTIONNAIRES
QUESTION_1 LAST FOUR WEEKS HOW MUCH OF THE TIME DID YOUR ASTHMA KEEP YOU FROM GETTING AS MUCH DONE AT WORK, SCHOOL OR AT HOME: SOME OF THE TIME
QUESTION_4 LAST FOUR WEEKS HOW OFTEN HAVE YOU USED YOUR RESCUE INHALER OR NEBULIZER MEDICATION (SUCH AS ALBUTEROL): ONCE A WEEK OR LESS
QUESTION_3 LAST FOUR WEEKS HOW OFTEN DID YOUR ASTHMA SYMPTOMS (WHEEZING, COUGHING, SHORTNESS OF BREATH, CHEST TIGHTNESS OR PAIN) WAKE YOU UP AT NIGHT OR EARLIER THAN USUAL IN THE MORNING: NOT AT ALL
QUESTION_2 LAST FOUR WEEKS HOW OFTEN HAVE YOU HAD SHORTNESS OF BREATH: ONCE OR TWICE A WEEK
QUESTION_5 LAST FOUR WEEKS HOW WOULD YOU RATE YOUR ASTHMA CONTROL: WELL CONTROLLED
ACT_TOTALSCORE: 20

## 2025-07-29 ENCOUNTER — ORDERS ONLY (AUTO-RELEASED) (OUTPATIENT)
Dept: FAMILY MEDICINE | Facility: CLINIC | Age: 65
End: 2025-07-29

## 2025-07-29 ENCOUNTER — OFFICE VISIT (OUTPATIENT)
Dept: FAMILY MEDICINE | Facility: CLINIC | Age: 65
End: 2025-07-29
Attending: FAMILY MEDICINE
Payer: COMMERCIAL

## 2025-07-29 VITALS
RESPIRATION RATE: 16 BRPM | HEART RATE: 78 BPM | DIASTOLIC BLOOD PRESSURE: 78 MMHG | HEIGHT: 68 IN | TEMPERATURE: 98 F | OXYGEN SATURATION: 94 % | BODY MASS INDEX: 37.89 KG/M2 | WEIGHT: 250 LBS | SYSTOLIC BLOOD PRESSURE: 137 MMHG

## 2025-07-29 DIAGNOSIS — Z12.11 SCREEN FOR COLON CANCER: ICD-10-CM

## 2025-07-29 DIAGNOSIS — I10 ESSENTIAL HYPERTENSION WITH GOAL BLOOD PRESSURE LESS THAN 140/90: ICD-10-CM

## 2025-07-29 DIAGNOSIS — E11.9 TYPE 2 DIABETES MELLITUS WITHOUT COMPLICATION, WITHOUT LONG-TERM CURRENT USE OF INSULIN (H): Primary | ICD-10-CM

## 2025-07-29 LAB
EST. AVERAGE GLUCOSE BLD GHB EST-MCNC: 169 MG/DL
HBA1C MFR BLD: 7.5 % (ref 0–5.6)

## 2025-07-29 PROCEDURE — 3051F HG A1C>EQUAL 7.0%<8.0%: CPT | Performed by: FAMILY MEDICINE

## 2025-07-29 PROCEDURE — 36415 COLL VENOUS BLD VENIPUNCTURE: CPT | Performed by: FAMILY MEDICINE

## 2025-07-29 PROCEDURE — 3078F DIAST BP <80 MM HG: CPT | Performed by: FAMILY MEDICINE

## 2025-07-29 PROCEDURE — 99213 OFFICE O/P EST LOW 20 MIN: CPT | Performed by: FAMILY MEDICINE

## 2025-07-29 PROCEDURE — 3075F SYST BP GE 130 - 139MM HG: CPT | Performed by: FAMILY MEDICINE

## 2025-07-29 PROCEDURE — G2211 COMPLEX E/M VISIT ADD ON: HCPCS | Performed by: FAMILY MEDICINE

## 2025-07-29 PROCEDURE — 83036 HEMOGLOBIN GLYCOSYLATED A1C: CPT | Performed by: FAMILY MEDICINE

## 2025-07-29 RX ORDER — HYDROCHLOROTHIAZIDE 12.5 MG/1
12.5 TABLET ORAL DAILY
Qty: 90 TABLET | Refills: 3 | Status: SHIPPED | OUTPATIENT
Start: 2025-07-29

## 2025-07-29 NOTE — PROGRESS NOTES
"  {PROVIDER CHARTING PREFERENCE:800621}    Subjective   Breezy is a 65 year old, presenting for the following health issues:  Diabetes and Blood Pressure Check      7/29/2025     7:10 AM   Additional Questions   Roomed by Roberta CAMACHO      {MA/LPN/RN Pre-Provider Visit Orders- hCG/UA/Strep (Optional):341038}  {SUPERLIST (Optional):028581}  {additonal problems for provider to add (Optional):183315}    {ROS Picklists (Optional):798446}      Objective    BP (!) 156/75   Pulse 78   Temp 98  F (36.7  C) (Oral)   Resp 16   Ht 1.739 m (5' 8.47\")   Wt 113.4 kg (250 lb)   SpO2 94%   BMI 37.49 kg/m    Body mass index is 37.49 kg/m .  Physical Exam   {Exam List (Optional):797094}    {Diagnostic Test Results (Optional):120381}        Signed Electronically by: Rajiv Figueroa MD, MD  {Email feedback regarding this note to [email or web address]   :793988}  " "TABLET BY MOUTH TWICE WEEKLY 24 tablet 3    cholecalciferol, vitamin D3, (VITAMIN D3) 2,000 unit cap [CHOLECALCIFEROL, VITAMIN D3, (VITAMIN D3) 2,000 UNIT CAP] Take 1 capsule by mouth daily.      dorzolamide-timolol (COSOPT) 22.3-6.8 mg/mL ophthalmic solution [DORZOLAMIDE-TIMOLOL (COSOPT) 22.3-6.8 MG/ML OPHTHALMIC SOLUTION]   10    EPINEPHrine (ANY BX GENERIC EQUIV) 0.3 MG/0.3ML injection 2-pack Inject 0.3 mLs (0.3 mg) into the muscle as needed for anaphylaxis 1 each 1    fluticasone (ARNUITY ELLIPTA) 100 MCG/ACT inhaler Inhale 1 puff into the lungs daily 1 each 11    hydroCHLOROthiazide 12.5 MG tablet Take 1 tablet (12.5 mg) by mouth daily. 90 tablet 3    latanoprost (XALATAN) 0.005 % ophthalmic solution [LATANOPROST (XALATAN) 0.005 % OPHTHALMIC SOLUTION]   10    lisinopril (ZESTRIL) 40 MG tablet TAKE ONE TABLET BY MOUTH ONE TIME DAILY 90 tablet 2    loratadine (CLARITIN) 10 mg tablet [LORATADINE (CLARITIN) 10 MG TABLET] Take 10 mg by mouth daily.      metFORMIN (GLUCOPHAGE XR) 500 MG 24 hr tablet Take 2 tablets (1,000 mg) by mouth daily. 180 tablet 3    montelukast (SINGULAIR) 10 MG tablet TAKE ONE TABLET BY MOUTH ONE TIME DAILY 90 tablet 3    MULTIVITAMIN ORAL [MULTIVITAMIN ORAL] Take 1 tablet by mouth daily.      venlafaxine (EFFEXOR XR) 150 MG 24 hr capsule TAKE ONE CAPSULE BY MOUTH ONE TIME DAILY 90 capsule 3                   Objective    /78   Pulse 78   Temp 98  F (36.7  C) (Oral)   Resp 16   Ht 1.739 m (5' 8.47\")   Wt 113.4 kg (250 lb)   SpO2 94%   BMI 37.49 kg/m    Body mass index is 37.49 kg/m .  Physical Exam     Heart normal  Lungs normal  Feet: normal monofilament          Signed Electronically by: Rajiv Figueroa MD    "